# Patient Record
Sex: FEMALE | Race: WHITE | NOT HISPANIC OR LATINO
[De-identification: names, ages, dates, MRNs, and addresses within clinical notes are randomized per-mention and may not be internally consistent; named-entity substitution may affect disease eponyms.]

---

## 2021-06-10 ENCOUNTER — TRANSCRIPTION ENCOUNTER (OUTPATIENT)
Age: 36
End: 2021-06-10

## 2021-06-10 ENCOUNTER — INPATIENT (INPATIENT)
Facility: HOSPITAL | Age: 36
LOS: 2 days | Discharge: HOME | DRG: 416 | End: 2021-06-13
Attending: SURGERY | Admitting: SURGERY
Payer: MEDICAID

## 2021-06-10 VITALS
TEMPERATURE: 99 F | WEIGHT: 216.05 LBS | HEART RATE: 88 BPM | RESPIRATION RATE: 18 BRPM | DIASTOLIC BLOOD PRESSURE: 80 MMHG | SYSTOLIC BLOOD PRESSURE: 129 MMHG | HEIGHT: 64 IN | OXYGEN SATURATION: 96 %

## 2021-06-10 DIAGNOSIS — Z90.3 ACQUIRED ABSENCE OF STOMACH [PART OF]: Chronic | ICD-10-CM

## 2021-06-10 DIAGNOSIS — Z98.84 BARIATRIC SURGERY STATUS: Chronic | ICD-10-CM

## 2021-06-10 LAB
ALBUMIN SERPL ELPH-MCNC: 3.9 G/DL — SIGNIFICANT CHANGE UP (ref 3.3–5)
ALP SERPL-CCNC: 108 U/L — SIGNIFICANT CHANGE UP (ref 40–120)
ALT FLD-CCNC: 330 U/L — HIGH (ref 10–45)
ANION GAP SERPL CALC-SCNC: 12 MMOL/L — SIGNIFICANT CHANGE UP (ref 5–17)
APPEARANCE UR: CLEAR — SIGNIFICANT CHANGE UP
APTT BLD: 29.4 SEC — SIGNIFICANT CHANGE UP (ref 27.5–35.5)
AST SERPL-CCNC: 259 U/L — HIGH (ref 10–40)
BASOPHILS # BLD AUTO: 0.02 K/UL — SIGNIFICANT CHANGE UP (ref 0–0.2)
BASOPHILS NFR BLD AUTO: 0.3 % — SIGNIFICANT CHANGE UP (ref 0–2)
BILIRUB DIRECT SERPL-MCNC: 3.8 MG/DL — HIGH (ref 0–0.2)
BILIRUB INDIRECT FLD-MCNC: 0.4 MG/DL — SIGNIFICANT CHANGE UP (ref 0.2–1)
BILIRUB SERPL-MCNC: 4.2 MG/DL — HIGH (ref 0.2–1.2)
BILIRUB SERPL-MCNC: 4.3 MG/DL — HIGH (ref 0.2–1.2)
BILIRUB UR-MCNC: ABNORMAL
BLD GP AB SCN SERPL QL: NEGATIVE — SIGNIFICANT CHANGE UP
BUN SERPL-MCNC: 5 MG/DL — LOW (ref 7–23)
CALCIUM SERPL-MCNC: 8.7 MG/DL — SIGNIFICANT CHANGE UP (ref 8.4–10.5)
CHLORIDE SERPL-SCNC: 101 MMOL/L — SIGNIFICANT CHANGE UP (ref 96–108)
CO2 SERPL-SCNC: 25 MMOL/L — SIGNIFICANT CHANGE UP (ref 22–31)
COLOR SPEC: YELLOW — SIGNIFICANT CHANGE UP
CREAT SERPL-MCNC: 0.59 MG/DL — SIGNIFICANT CHANGE UP (ref 0.5–1.3)
DIFF PNL FLD: ABNORMAL
EOSINOPHIL # BLD AUTO: 0.21 K/UL — SIGNIFICANT CHANGE UP (ref 0–0.5)
EOSINOPHIL NFR BLD AUTO: 3.1 % — SIGNIFICANT CHANGE UP (ref 0–6)
GLUCOSE SERPL-MCNC: 117 MG/DL — HIGH (ref 70–99)
GLUCOSE UR QL: NEGATIVE — SIGNIFICANT CHANGE UP
HCG UR QL: NEGATIVE — SIGNIFICANT CHANGE UP
HCT VFR BLD CALC: 39.3 % — SIGNIFICANT CHANGE UP (ref 34.5–45)
HGB BLD-MCNC: 12.5 G/DL — SIGNIFICANT CHANGE UP (ref 11.5–15.5)
IMM GRANULOCYTES NFR BLD AUTO: 0.3 % — SIGNIFICANT CHANGE UP (ref 0–1.5)
INR BLD: 1.17 — HIGH (ref 0.88–1.16)
KETONES UR-MCNC: NEGATIVE — SIGNIFICANT CHANGE UP
LEUKOCYTE ESTERASE UR-ACNC: ABNORMAL
LIDOCAIN IGE QN: 21 U/L — SIGNIFICANT CHANGE UP (ref 7–60)
LYMPHOCYTES # BLD AUTO: 1.63 K/UL — SIGNIFICANT CHANGE UP (ref 1–3.3)
LYMPHOCYTES # BLD AUTO: 24.4 % — SIGNIFICANT CHANGE UP (ref 13–44)
MCHC RBC-ENTMCNC: 25.1 PG — LOW (ref 27–34)
MCHC RBC-ENTMCNC: 31.8 GM/DL — LOW (ref 32–36)
MCV RBC AUTO: 78.9 FL — LOW (ref 80–100)
MONOCYTES # BLD AUTO: 0.77 K/UL — SIGNIFICANT CHANGE UP (ref 0–0.9)
MONOCYTES NFR BLD AUTO: 11.5 % — SIGNIFICANT CHANGE UP (ref 2–14)
NEUTROPHILS # BLD AUTO: 4.03 K/UL — SIGNIFICANT CHANGE UP (ref 1.8–7.4)
NEUTROPHILS NFR BLD AUTO: 60.4 % — SIGNIFICANT CHANGE UP (ref 43–77)
NITRITE UR-MCNC: NEGATIVE — SIGNIFICANT CHANGE UP
NRBC # BLD: 0 /100 WBCS — SIGNIFICANT CHANGE UP (ref 0–0)
PH UR: 7.5 — SIGNIFICANT CHANGE UP (ref 5–8)
PLATELET # BLD AUTO: 237 K/UL — SIGNIFICANT CHANGE UP (ref 150–400)
POTASSIUM SERPL-MCNC: 3.8 MMOL/L — SIGNIFICANT CHANGE UP (ref 3.5–5.3)
POTASSIUM SERPL-SCNC: 3.8 MMOL/L — SIGNIFICANT CHANGE UP (ref 3.5–5.3)
PROT SERPL-MCNC: 7.1 G/DL — SIGNIFICANT CHANGE UP (ref 6–8.3)
PROT UR-MCNC: NEGATIVE MG/DL — SIGNIFICANT CHANGE UP
PROTHROM AB SERPL-ACNC: 13.9 SEC — HIGH (ref 10.6–13.6)
RBC # BLD: 4.98 M/UL — SIGNIFICANT CHANGE UP (ref 3.8–5.2)
RBC # FLD: 14.3 % — SIGNIFICANT CHANGE UP (ref 10.3–14.5)
RH IG SCN BLD-IMP: POSITIVE — SIGNIFICANT CHANGE UP
SARS-COV-2 RNA SPEC QL NAA+PROBE: SIGNIFICANT CHANGE UP
SODIUM SERPL-SCNC: 138 MMOL/L — SIGNIFICANT CHANGE UP (ref 135–145)
SP GR SPEC: 1.01 — SIGNIFICANT CHANGE UP (ref 1–1.03)
UROBILINOGEN FLD QL: 2 E.U./DL
WBC # BLD: 6.68 K/UL — SIGNIFICANT CHANGE UP (ref 3.8–10.5)
WBC # FLD AUTO: 6.68 K/UL — SIGNIFICANT CHANGE UP (ref 3.8–10.5)

## 2021-06-10 PROCEDURE — 76705 ECHO EXAM OF ABDOMEN: CPT | Mod: 26

## 2021-06-10 PROCEDURE — 99285 EMERGENCY DEPT VISIT HI MDM: CPT

## 2021-06-10 RX ORDER — CEFTRIAXONE 500 MG/1
1000 INJECTION, POWDER, FOR SOLUTION INTRAMUSCULAR; INTRAVENOUS EVERY 24 HOURS
Refills: 0 | Status: DISCONTINUED | OUTPATIENT
Start: 2021-06-10 | End: 2021-06-13

## 2021-06-10 RX ORDER — METRONIDAZOLE 500 MG
TABLET ORAL
Refills: 0 | Status: DISCONTINUED | OUTPATIENT
Start: 2021-06-10 | End: 2021-06-13

## 2021-06-10 RX ORDER — HEPARIN SODIUM 5000 [USP'U]/ML
5000 INJECTION INTRAVENOUS; SUBCUTANEOUS EVERY 8 HOURS
Refills: 0 | Status: DISCONTINUED | OUTPATIENT
Start: 2021-06-10 | End: 2021-06-13

## 2021-06-10 RX ORDER — KETOROLAC TROMETHAMINE 30 MG/ML
30 SYRINGE (ML) INJECTION ONCE
Refills: 0 | Status: DISCONTINUED | OUTPATIENT
Start: 2021-06-10 | End: 2021-06-10

## 2021-06-10 RX ORDER — ESCITALOPRAM OXALATE 10 MG/1
40 TABLET, FILM COATED ORAL DAILY
Refills: 0 | Status: DISCONTINUED | OUTPATIENT
Start: 2021-06-10 | End: 2021-06-13

## 2021-06-10 RX ORDER — ONDANSETRON 8 MG/1
4 TABLET, FILM COATED ORAL ONCE
Refills: 0 | Status: COMPLETED | OUTPATIENT
Start: 2021-06-10 | End: 2021-06-10

## 2021-06-10 RX ORDER — METRONIDAZOLE 500 MG
500 TABLET ORAL EVERY 8 HOURS
Refills: 0 | Status: DISCONTINUED | OUTPATIENT
Start: 2021-06-11 | End: 2021-06-13

## 2021-06-10 RX ORDER — METRONIDAZOLE 500 MG
500 TABLET ORAL ONCE
Refills: 0 | Status: COMPLETED | OUTPATIENT
Start: 2021-06-10 | End: 2021-06-10

## 2021-06-10 RX ORDER — HYDROMORPHONE HYDROCHLORIDE 2 MG/ML
0.25 INJECTION INTRAMUSCULAR; INTRAVENOUS; SUBCUTANEOUS EVERY 6 HOURS
Refills: 0 | Status: DISCONTINUED | OUTPATIENT
Start: 2021-06-10 | End: 2021-06-13

## 2021-06-10 RX ORDER — ONDANSETRON 8 MG/1
4 TABLET, FILM COATED ORAL EVERY 4 HOURS
Refills: 0 | Status: DISCONTINUED | OUTPATIENT
Start: 2021-06-10 | End: 2021-06-13

## 2021-06-10 RX ORDER — ACETAMINOPHEN 500 MG
1000 TABLET ORAL ONCE
Refills: 0 | Status: DISCONTINUED | OUTPATIENT
Start: 2021-06-10 | End: 2021-06-13

## 2021-06-10 RX ORDER — SODIUM CHLORIDE 9 MG/ML
1000 INJECTION, SOLUTION INTRAVENOUS
Refills: 0 | Status: DISCONTINUED | OUTPATIENT
Start: 2021-06-10 | End: 2021-06-11

## 2021-06-10 RX ORDER — SODIUM CHLORIDE 9 MG/ML
1000 INJECTION INTRAMUSCULAR; INTRAVENOUS; SUBCUTANEOUS ONCE
Refills: 0 | Status: COMPLETED | OUTPATIENT
Start: 2021-06-10 | End: 2021-06-10

## 2021-06-10 RX ADMIN — Medication 30 MILLIGRAM(S): at 17:58

## 2021-06-10 RX ADMIN — Medication 100 MILLIGRAM(S): at 20:32

## 2021-06-10 RX ADMIN — Medication 30 MILLIGRAM(S): at 20:06

## 2021-06-10 RX ADMIN — SODIUM CHLORIDE 1000 MILLILITER(S): 9 INJECTION INTRAMUSCULAR; INTRAVENOUS; SUBCUTANEOUS at 17:55

## 2021-06-10 RX ADMIN — ONDANSETRON 4 MILLIGRAM(S): 8 TABLET, FILM COATED ORAL at 17:56

## 2021-06-10 RX ADMIN — CEFTRIAXONE 100 MILLIGRAM(S): 500 INJECTION, POWDER, FOR SOLUTION INTRAMUSCULAR; INTRAVENOUS at 19:23

## 2021-06-10 NOTE — H&P ADULT - ASSESSMENT
34 yo F no PMH, PSH of lap band 10 years ago and laparoscopic sleeve gastrectomy 5 years ago at [facility] with Dr. Faith who presents with concern for acute cholecystitis w/ or w/o choledocholithiasis.    Plan:  Admit to General Surgery Team 4, Regional Dr. Morales  NPO/IVF  Pain/Nausea control PRN  MRCP  GI consult--Zlatanic for ERCP evaluation  Ceftriaxone/Flagyl  HSQ/SCD  AM labs  Plan discussed with chief resident and attending

## 2021-06-10 NOTE — ED ADULT NURSE NOTE - OBJECTIVE STATEMENT
Patient arrives via EMS for eval of abd pain n/v x a couple of days.  Patient relates "I'm having a gall bladder attack."  Took antispasmodic that GI  prescribed.  EMS placed 20g IV R AC, gave 600ml IV NS, reports feeling better now.

## 2021-06-10 NOTE — ED ADULT NURSE REASSESSMENT NOTE - COMFORT CARE
ambulated to bathroom/wait time explained
plan of care explained/treatment delay explained/wait time explained

## 2021-06-10 NOTE — ED ADULT NURSE REASSESSMENT NOTE - NS ED NURSE REASSESS COMMENT FT1
Patient checking with family member nurse who works here to see if she should accept sub q heparin or not.

## 2021-06-10 NOTE — ED PROVIDER NOTE - OBJECTIVE STATEMENT
history of gallstones, here with abdominal pain for the past 2 days. Right sided, associated with nausea/vomiting, po intolerance. Denies fever, chills, diarrhea. Reports taking bentyl with some relief and having outpatient US that showed gallstones. Pain currently subsided but not resolved. Some radiation to back, lower abdomen.

## 2021-06-10 NOTE — H&P ADULT - HISTORY OF PRESENT ILLNESS
This is a 36 yo F no PMH, PSH of lap band 10 years ago and laparoscopic sleeve gastrectomy 5 years ago at [facility] with Dr. Faith who presents with RUQ abdominal pain since Tuesday. Pt states that she has had "gallbladder attacks" in the past, last one three years ago, but nothing as severe as this. She states the severity of her pain since Tuesday has varied, but it has never subsided completely. She states she has had multiple episodes of emesis, dark colored urine, endorses headaches and dizziness. Denies fevers, chills, chest pain, sob. Pt has never had a colonoscopy, had EGD 2 years ago for evaluation of reflux. She has had ultrasounds in past with evidence of cholelithiasis.     In ED, pt is afebrile, HDS. Labs are significant for bilirubin of 4.3, AST//330.

## 2021-06-10 NOTE — ED ADULT TRIAGE NOTE - CHIEF COMPLAINT QUOTE
BIBA from home c/o abdominal pain with n/v starting 2 days ago. hx gallstones. reports pain is 2/10 at this time. given 600ml NS via 20g in R a/c by EMS. denies fevers.

## 2021-06-10 NOTE — H&P ADULT - NSHPLABSRESULTS_GEN_ALL_CORE
12.5   6.68  )-----------( 237      ( 10 Gee 2021 18:03 )             39.3   06-10    138  |  101  |  5<L>  ----------------------------<  117<H>  3.8   |  25  |  0.59    Ca    8.7      10 Gee 2021 18:03    TPro  7.1  /  Alb  3.9  /  TBili  4.3<H>  /  DBili  x   /  AST  259<H>  /  ALT  330<H>  /  AlkPhos  108  06-10

## 2021-06-10 NOTE — H&P ADULT - NSICDXPASTSURGICALHX_GEN_ALL_CORE_FT
PAST SURGICAL HISTORY:  H/O laparoscopic adjustable gastric banding     History of sleeve gastrectomy

## 2021-06-10 NOTE — H&P ADULT - NSHPPHYSICALEXAM_GEN_ALL_CORE
Vital Signs Last 24 Hrs  T(C): 37.1 (10 Gee 2021 17:23), Max: 37.1 (10 Gee 2021 17:23)  T(F): 98.7 (10 Gee 2021 17:23), Max: 98.7 (10 Gee 2021 17:23)  HR: 88 (10 Gee 2021 17:23) (88 - 88)  BP: 129/80 (10 Gee 2021 17:23) (129/80 - 129/80)  BP(mean): --  RR: 18 (10 Gee 2021 17:23) (18 - 18)  SpO2: 96% (10 Gee 2021 17:23) (96% - 96%)    General: Appears stated age, No acute distress, WD/WN  Head: NC/AT  EENT: PERRLA. EOMI. Conjunctiva and sclera clear. Pharynx clear.  Neck: Supple.  Lungs: CTA B/l. Nonlabored Respirations  CV: +S1S2, RRR  Abdomen: Nondistended, tender in RUQ, soft  Extremities: Warm and well perfused. 2+ peripheral pulses b/l. Calf soft, nontender b/l. No pedal edema.  Neuro: A&Ox3.

## 2021-06-10 NOTE — ED PROVIDER NOTE - CLINICAL SUMMARY MEDICAL DECISION MAKING FREE TEXT BOX
ruq pain x 2 days with n/v, reported gallstones. concern for acute cholecystis vs biliary colic/ pancreatitis. labs / ruq us. known to surgery who came to ED to see her after arrival. requested admission. us pending at time of admit. toradol/ zofran/ ivf for symptoms. wbc normal but lft elevated.

## 2021-06-11 DIAGNOSIS — K80.20 CALCULUS OF GALLBLADDER WITHOUT CHOLECYSTITIS WITHOUT OBSTRUCTION: ICD-10-CM

## 2021-06-11 DIAGNOSIS — K80.50 CALCULUS OF BILE DUCT WITHOUT CHOLANGITIS OR CHOLECYSTITIS WITHOUT OBSTRUCTION: ICD-10-CM

## 2021-06-11 LAB
ALBUMIN SERPL ELPH-MCNC: 3.4 G/DL — SIGNIFICANT CHANGE UP (ref 3.3–5)
ALP SERPL-CCNC: 99 U/L — SIGNIFICANT CHANGE UP (ref 40–120)
ALT FLD-CCNC: 265 U/L — HIGH (ref 10–45)
ANION GAP SERPL CALC-SCNC: 10 MMOL/L — SIGNIFICANT CHANGE UP (ref 5–17)
AST SERPL-CCNC: 178 U/L — HIGH (ref 10–40)
BILIRUB SERPL-MCNC: 4.8 MG/DL — HIGH (ref 0.2–1.2)
BUN SERPL-MCNC: 4 MG/DL — LOW (ref 7–23)
CALCIUM SERPL-MCNC: 8.7 MG/DL — SIGNIFICANT CHANGE UP (ref 8.4–10.5)
CHLORIDE SERPL-SCNC: 99 MMOL/L — SIGNIFICANT CHANGE UP (ref 96–108)
CO2 SERPL-SCNC: 24 MMOL/L — SIGNIFICANT CHANGE UP (ref 22–31)
COVID-19 SPIKE DOMAIN AB INTERP: POSITIVE
COVID-19 SPIKE DOMAIN ANTIBODY RESULT: >250 U/ML — HIGH
CREAT SERPL-MCNC: 0.62 MG/DL — SIGNIFICANT CHANGE UP (ref 0.5–1.3)
GLUCOSE SERPL-MCNC: 95 MG/DL — SIGNIFICANT CHANGE UP (ref 70–99)
HCT VFR BLD CALC: 36.6 % — SIGNIFICANT CHANGE UP (ref 34.5–45)
HGB BLD-MCNC: 11.5 G/DL — SIGNIFICANT CHANGE UP (ref 11.5–15.5)
MAGNESIUM SERPL-MCNC: 1.9 MG/DL — SIGNIFICANT CHANGE UP (ref 1.6–2.6)
MCHC RBC-ENTMCNC: 25.1 PG — LOW (ref 27–34)
MCHC RBC-ENTMCNC: 31.4 GM/DL — LOW (ref 32–36)
MCV RBC AUTO: 79.9 FL — LOW (ref 80–100)
NRBC # BLD: 0 /100 WBCS — SIGNIFICANT CHANGE UP (ref 0–0)
PHOSPHATE SERPL-MCNC: 2.3 MG/DL — LOW (ref 2.5–4.5)
PLATELET # BLD AUTO: 210 K/UL — SIGNIFICANT CHANGE UP (ref 150–400)
POTASSIUM SERPL-MCNC: 3.4 MMOL/L — LOW (ref 3.5–5.3)
POTASSIUM SERPL-SCNC: 3.4 MMOL/L — LOW (ref 3.5–5.3)
PROT SERPL-MCNC: 6.3 G/DL — SIGNIFICANT CHANGE UP (ref 6–8.3)
RBC # BLD: 4.58 M/UL — SIGNIFICANT CHANGE UP (ref 3.8–5.2)
RBC # FLD: 14.4 % — SIGNIFICANT CHANGE UP (ref 10.3–14.5)
SARS-COV-2 IGG+IGM SERPL QL IA: >250 U/ML — HIGH
SARS-COV-2 IGG+IGM SERPL QL IA: POSITIVE
SODIUM SERPL-SCNC: 133 MMOL/L — LOW (ref 135–145)
WBC # BLD: 6.11 K/UL — SIGNIFICANT CHANGE UP (ref 3.8–10.5)
WBC # FLD AUTO: 6.11 K/UL — SIGNIFICANT CHANGE UP (ref 3.8–10.5)

## 2021-06-11 RX ORDER — SODIUM CHLORIDE 9 MG/ML
1000 INJECTION, SOLUTION INTRAVENOUS
Refills: 0 | Status: DISCONTINUED | OUTPATIENT
Start: 2021-06-11 | End: 2021-06-13

## 2021-06-11 RX ORDER — POTASSIUM CHLORIDE 20 MEQ
10 PACKET (EA) ORAL
Refills: 0 | Status: COMPLETED | OUTPATIENT
Start: 2021-06-11 | End: 2021-06-11

## 2021-06-11 RX ORDER — MAGNESIUM SULFATE 500 MG/ML
1 VIAL (ML) INJECTION ONCE
Refills: 0 | Status: COMPLETED | OUTPATIENT
Start: 2021-06-11 | End: 2021-06-11

## 2021-06-11 RX ORDER — POTASSIUM PHOSPHATE, MONOBASIC POTASSIUM PHOSPHATE, DIBASIC 236; 224 MG/ML; MG/ML
15 INJECTION, SOLUTION INTRAVENOUS ONCE
Refills: 0 | Status: COMPLETED | OUTPATIENT
Start: 2021-06-11 | End: 2021-06-11

## 2021-06-11 RX ADMIN — Medication 100 MILLIEQUIVALENT(S): at 11:29

## 2021-06-11 RX ADMIN — CEFTRIAXONE 100 MILLIGRAM(S): 500 INJECTION, POWDER, FOR SOLUTION INTRAMUSCULAR; INTRAVENOUS at 18:28

## 2021-06-11 RX ADMIN — SODIUM CHLORIDE 140 MILLILITER(S): 9 INJECTION, SOLUTION INTRAVENOUS at 02:30

## 2021-06-11 RX ADMIN — Medication 100 MILLIGRAM(S): at 06:19

## 2021-06-11 RX ADMIN — ESCITALOPRAM OXALATE 40 MILLIGRAM(S): 10 TABLET, FILM COATED ORAL at 14:48

## 2021-06-11 RX ADMIN — SODIUM CHLORIDE 100 MILLILITER(S): 9 INJECTION, SOLUTION INTRAVENOUS at 18:28

## 2021-06-11 RX ADMIN — Medication 100 GRAM(S): at 10:35

## 2021-06-11 RX ADMIN — POTASSIUM PHOSPHATE, MONOBASIC POTASSIUM PHOSPHATE, DIBASIC 62.5 MILLIMOLE(S): 236; 224 INJECTION, SOLUTION INTRAVENOUS at 23:51

## 2021-06-11 RX ADMIN — Medication 100 MILLIGRAM(S): at 22:10

## 2021-06-11 RX ADMIN — Medication 100 MILLIEQUIVALENT(S): at 13:55

## 2021-06-11 RX ADMIN — Medication 100 MILLIEQUIVALENT(S): at 12:56

## 2021-06-11 RX ADMIN — Medication 100 MILLIGRAM(S): at 14:49

## 2021-06-11 NOTE — CONSULT NOTE ADULT - PROBLEM SELECTOR RECOMMENDATION 9
1) Continue Ceftriaxone and Metronidazole  2) Pending Blood culture from ED  3) Possible Lap cholecystectomy on Sunday,  4) Management per surgery

## 2021-06-11 NOTE — CONSULT NOTE ADULT - SUBJECTIVE AND OBJECTIVE BOX
GASTROENTEROLOGY CONSULT NOTE  HPI:  This is a 36 yo F no PMH, PSH of lap band 10 years ago and laparoscopic sleeve gastrectomy 5 years ago at [facility] with Dr. Faith who presents with RUQ abdominal pain since Tuesday. Pt states that she has had "gallbladder attacks" in the past, last one three years ago, but nothing as severe as this. She states the severity of her pain since Tuesday has varied, but it has never subsided completely. She states she has had multiple episodes of emesis, dark colored urine, endorses headaches and dizziness. Denies fevers, chills, chest pain, sob. Pt has never had a colonoscopy, had EGD 2 years ago for evaluation of reflux. She has had ultrasounds in past with evidence of cholelithiasis.     In ED, pt is afebrile, HDS. Labs are significant for bilirubin of 4.3, AST//330.   (10 Gee 2021 18:55)    GI consulted for elevated bilirubin; mild biliary ductal dilation. Patient seen and examined at bedside.     Allergies    No Known Allergies    Intolerances      Home Medications:  Lexapro: 40 milligram(s) orally once a day (11 Jun 2021 03:08)  PriLOSEC 40 mg oral delayed release capsule: 1 cap(s) orally once a day (11 Jun 2021 03:08)    MEDICATIONS:  MEDICATIONS  (STANDING):  cefTRIAXone   IVPB 1000 milliGRAM(s) IV Intermittent every 24 hours  escitalopram 40 milliGRAM(s) Oral daily  heparin   Injectable 5000 Unit(s) SubCutaneous every 8 hours  lactated ringers. 1000 milliLiter(s) (140 mL/Hr) IV Continuous <Continuous>  magnesium sulfate  IVPB 1 Gram(s) IV Intermittent once  metroNIDAZOLE  IVPB      metroNIDAZOLE  IVPB 500 milliGRAM(s) IV Intermittent every 8 hours  potassium chloride  10 mEq/100 mL IVPB 10 milliEquivalent(s) IV Intermittent every 1 hour  potassium phosphate IVPB 15 milliMole(s) IV Intermittent once    MEDICATIONS  (PRN):  acetaminophen  IVPB .. 1000 milliGRAM(s) IV Intermittent once PRN Temp greater or equal to 38C (100.4F), Moderate Pain (4 - 6)  HYDROmorphone  Injectable 0.25 milliGRAM(s) IV Push every 6 hours PRN Severe Pain (7 - 10)  ondansetron Injectable 4 milliGRAM(s) IV Push every 4 hours PRN Nausea and/or Vomiting    PAST MEDICAL & SURGICAL HISTORY:  Gallstones    H/O laparoscopic adjustable gastric banding    History of sleeve gastrectomy      FAMILY HISTORY:    SOCIAL HISTORY:  Tobacco: [ ] Current, [ ] Former, [ ] Never; Pack Years:  Alcohol:  Illicit Drugs:    REVIEW OF SYSTEMS:  CONSTITUTIONAL: No weakness, fevers or chills  HEENT: No visual changes; No vertigo or throat pain   NECK: No pain or stiffness  RESPIRATORY: No cough, wheezing, hemoptysis; No shortness of breath  CARDIOVASCULAR: No chest pain or palpitations  GASTROINTESTINAL: As above.  GENITOURINARY: No dysuria, frequency or hematuria  NEUROLOGICAL: No numbness or weakness  SKIN: No itching, burning, rashes, or lesions   All other 10 review of systems is negative unless indicated above.    Vital Signs Last 24 Hrs  T(C): 36.9 (11 Jun 2021 06:25), Max: 37.2 (10 Gee 2021 20:23)  T(F): 98.4 (11 Jun 2021 06:25), Max: 99 (10 Gee 2021 20:23)  HR: 85 (11 Jun 2021 06:25) (74 - 88)  BP: 127/79 (11 Jun 2021 06:25) (118/62 - 133/76)  BP(mean): 95 (11 Jun 2021 06:25) (95 - 95)  RR: 16 (11 Jun 2021 06:25) (16 - 18)  SpO2: 96% (11 Jun 2021 06:25) (96% - 99%)    06-10 @ 07:01  -  06-11 @ 07:00  --------------------------------------------------------  IN: 730 mL / OUT: 300 mL / NET: 430 mL        PHYSICAL EXAM:    General: in no acute distress  Eyes: Anicteric sclerae, moist conjunctivae  HENT: Moist mucous membranes  Neck: Trachea midline, supple  Lungs: Normal respiratory effort, no intercostal retractions  Cardiovascular: RRR  Abdomen: Soft, +RUQ tenderness non-distended; No rebound or guarding  Extremities: Normal range of motion, No clubbing, cyanosis or edema  Neurological: Alert and oriented x3  Skin: Warm and dry. No obvious rash    LABS:                        11.5   6.11  )-----------( 210      ( 11 Jun 2021 05:55 )             36.6     06-11    133<L>  |  99  |  4<L>  ----------------------------<  95  3.4<L>   |  24  |  0.62    Ca    8.7      11 Jun 2021 05:55  Phos  2.3     06-11  Mg     1.9     06-11    TPro  6.3  /  Alb  3.4  /  TBili  4.8<H>  /  DBili  x   /  AST  178<H>  /  ALT  265<H>  /  AlkPhos  99  06-11        PT/INR - ( 10 Gee 2021 18:38 )   PT: 13.9 sec;   INR: 1.17          PTT - ( 10 Gee 2021 18:38 )  PTT:29.4 sec    RADIOLOGY & ADDITIONAL STUDIES:     Reviewed
HPI:  This is a 34 yo F no PMH, PSH of lap band 10 years ago and laparoscopic sleeve gastrectomy 5 years ago at [facility] with Dr. Faith who presents with RUQ abdominal pain since Tuesday. Pt states that she has had "gallbladder attacks" in the past, last one three years ago, but nothing as severe as this. She states the severity of her pain since Tuesday has varied, but it has never subsided completely. She states she has had multiple episodes of emesis, dark colored urine, endorses headaches and dizziness. Denies fevers, chills, chest pain, sob. Pt has never had a colonoscopy, had EGD 2 years ago for evaluation of reflux. She has had ultrasounds in past with evidence of cholelithiasis.     In ED, pt is afebrile, HDS. Labs are significant for bilirubin of 4.3, AST//330.   (10 Gee 2021 18:55)   s/p ERCP w/ sphincterotomy, balloon sweep, sludge removal      PAST MEDICAL & SURGICAL HISTORY:  Gallstones    H/O laparoscopic adjustable gastric banding    History of sleeve gastrectomy      REVIEW OF SYSTEMS:    General:  no weakness; no fevers, no chills  Skin/Breast: no rash  Respiratory and Thorax: no SOB, no cough  Cardiovascular:	No chest pain  Gastrointestinal:	 no nausea, vomiting , (+) frequent stools, RUQ pain  Genitourinary:	no dysuria, no difficulty urinating, no hematuria  Musculoskeletal:	 no weakness, no joint swelling/pain  Neurological:	no focal weakness/numbness  Endocrine: no polyuria, no polydipsia      ANTIBIOTICS:  MEDICATIONS  (STANDING):  cefTRIAXone   IVPB 1000 milliGRAM(s) IV Intermittent every 24 hours  escitalopram 40 milliGRAM(s) Oral daily  heparin   Injectable 5000 Unit(s) SubCutaneous every 8 hours  metroNIDAZOLE  IVPB      metroNIDAZOLE  IVPB 500 milliGRAM(s) IV Intermittent every 8 hours  potassium phosphate IVPB 15 milliMole(s) IV Intermittent once    MEDICATIONS  (PRN):  acetaminophen  IVPB .. 1000 milliGRAM(s) IV Intermittent once PRN Temp greater or equal to 38C (100.4F), Moderate Pain (4 - 6)  HYDROmorphone  Injectable 0.25 milliGRAM(s) IV Push every 6 hours PRN Severe Pain (7 - 10)  ondansetron Injectable 4 milliGRAM(s) IV Push every 4 hours PRN Nausea and/or Vomiting      Allergies: No Known Allergies    SOCIAL HISTORY: no ETOH, no smoking    FAMILY HISTORY: n/c      Vital Signs Last 24 Hrs  T(C): 36.8 (2021 15:29), Max: 37.2 (10 Gee 2021 20:23)  T(F): 98.2 (2021 15:29), Max: 99 (10 Gee 2021 20:23)  HR: 79 (2021 15:) (74 - 85)  BP: 123/76 (2021 15:) (107/70 - 133/76)  BP(mean): 95 (2021 06:25) (95 - 95)  RR: 18 (2021 15:) (16 - 18)  SpO2: 97% (2021 15:) (96% - 99%)    06-10-21 @ 07:01  -  21 @ 07:00  --------------------------------------------------------  IN: 730 mL / OUT: 300 mL / NET: 430 mL    21 @ 07:01  -  21 @ 19:37  --------------------------------------------------------  IN: 0 mL / OUT: 800 mL / NET: -800 mL        PHYSICAL EXAM:  Constitutional: Well-developed, well nourished  Eyes: LOCO, EOMI  Ear/Nose/Throat: no oral lesion, no sinus tenderness on percussion	  Neck: no JVD, no lymphadenopathy, supple  Respiratory: CTA rosenda  Cardiovascular: S1S2 RRR, no murmurs  Gastrointestinal: Soft,(+) BS,  minimally tender to RUQ,  Extremities: no e/e/c  Vascular: DP Pulse: right normal; left normal            LABS:                        11.5   6.11  )-----------( 210      ( 2021 05:55 )             36.6     06-11    133<L>  |  99  |  4<L>  ----------------------------<  95  3.4<L>   |  24  |  0.62    Ca    8.7      2021 05:55  Phos  2.3       Mg     1.9     -    TPro  6.3  /  Alb  3.4  /  TBili  4.8<H>  /  DBili  x   /  AST  178<H>  /  ALT  265<H>  /  AlkPhos  99  06-11    PT/INR - ( 10 Gee 2021 18:38 )   PT: 13.9 sec;   INR: 1.17          PTT - ( 10 Gee 2021 18:38 )  PTT:29.4 sec  Urinalysis Basic - ( 10 Gee 2021 18:04 )    Color: Yellow / Appearance: Clear / S.015 / pH: x  Gluc: x / Ketone: NEGATIVE  / Bili: Moderate / Urobili: 2.0 E.U./dL   Blood: x / Protein: NEGATIVE mg/dL / Nitrite: NEGATIVE   Leuk Esterase: Small / RBC: < 5 /HPF / WBC < 5 /HPF   Sq Epi: x / Non Sq Epi: Moderate /HPF / Bacteria: Present /HPF      MICROBIOLOGY:    RADIOLOGY & ADDITIONAL STUDIES:  EXAM:  US ABDOMEN LIMITED                          PROCEDURE DATE:  06/10/2021          INTERPRETATION:  RIGHT UPPER QUADRANT ULTRASOUND dated 6/10/2021 9:00 PM    INDICATION: Right upper quadrant pain/gallstones.    PRIOR STUDIES: None.    FINDINGS:    Liver: Borderline increased echogenicity with no visible focal abnormality. Liver measures 16.7 cm in craniocaudal dimensions.    Main portal vein: Hepatopetal flow.    Intrahepatic ducts: Not dilated.    Common bile duct: Measures 0.7 cm. Borderline dilated.    Gallbladder: Multiple layering tiny gallstones.  No GB wall thickening or pericholecystic fluid. Mild gallbladder distention. Limited assessment for sonographic Taylor sign given the patient was on pain medication.    Pancreas: Visualized pancreas is unremarkable.    Abdominal aorta: No abdominal aortic aneurysm is seen.    Inferior vena cava: The visualized portions were normal in appearance.    Right kidney: Length of 11.1 cm. Normal echogenicity. No focal lesions.    IMPRESSION:  1.  Cholelithiasis without definitive evidence of acute cholecystitis.

## 2021-06-11 NOTE — CONSULT NOTE ADULT - ASSESSMENT
34 yo F no PMH, PSH of lap band 10 years ago and laparoscopic sleeve gastrectomy 5 years ago at [facility] with Dr. Faith who presents with RUQ abdominal pain since Tuesday. GI consulted for elevated bilirubin; mild biliary ductal dilation.     #Biliary dilation  - U/S images personally reviewed; mild biliary ductal dilation, no obvious choledocholithiasis  - s/p ERCP w/ sphincterotomy, balloon sweep, sludge removal  - remainder of care per Surgery  - monitor liver tests    Alondra Wheeler MD  PGY-4, Gastroenterology Fellow  pager: [phone]
34 yo F no PMH, PSH of lap band 10 years ago and laparoscopic sleeve gastrectomy 5 years ago at [facility] with Dr. Faith who presents with RUQ abdominal pain x 3 days, on US Cholelithiasis, s/p ERCP with sludge removal, bilirubin trending up 4.8

## 2021-06-12 ENCOUNTER — TRANSCRIPTION ENCOUNTER (OUTPATIENT)
Age: 36
End: 2021-06-12

## 2021-06-12 LAB
ALBUMIN SERPL ELPH-MCNC: 3.7 G/DL — SIGNIFICANT CHANGE UP (ref 3.3–5)
ALP SERPL-CCNC: 100 U/L — SIGNIFICANT CHANGE UP (ref 40–120)
ALT FLD-CCNC: 197 U/L — HIGH (ref 10–45)
ANION GAP SERPL CALC-SCNC: 10 MMOL/L — SIGNIFICANT CHANGE UP (ref 5–17)
AST SERPL-CCNC: 65 U/L — HIGH (ref 10–40)
BILIRUB SERPL-MCNC: 1 MG/DL — SIGNIFICANT CHANGE UP (ref 0.2–1.2)
BUN SERPL-MCNC: 4 MG/DL — LOW (ref 7–23)
CALCIUM SERPL-MCNC: 8.8 MG/DL — SIGNIFICANT CHANGE UP (ref 8.4–10.5)
CHLORIDE SERPL-SCNC: 106 MMOL/L — SIGNIFICANT CHANGE UP (ref 96–108)
CO2 SERPL-SCNC: 24 MMOL/L — SIGNIFICANT CHANGE UP (ref 22–31)
CREAT SERPL-MCNC: 0.58 MG/DL — SIGNIFICANT CHANGE UP (ref 0.5–1.3)
GLUCOSE SERPL-MCNC: 119 MG/DL — HIGH (ref 70–99)
HCT VFR BLD CALC: 37.7 % — SIGNIFICANT CHANGE UP (ref 34.5–45)
HGB BLD-MCNC: 11.8 G/DL — SIGNIFICANT CHANGE UP (ref 11.5–15.5)
LIDOCAIN IGE QN: 83 U/L — HIGH (ref 7–60)
MAGNESIUM SERPL-MCNC: 2 MG/DL — SIGNIFICANT CHANGE UP (ref 1.6–2.6)
MCHC RBC-ENTMCNC: 25.4 PG — LOW (ref 27–34)
MCHC RBC-ENTMCNC: 31.3 GM/DL — LOW (ref 32–36)
MCV RBC AUTO: 81.3 FL — SIGNIFICANT CHANGE UP (ref 80–100)
NRBC # BLD: 0 /100 WBCS — SIGNIFICANT CHANGE UP (ref 0–0)
PHOSPHATE SERPL-MCNC: 3 MG/DL — SIGNIFICANT CHANGE UP (ref 2.5–4.5)
PLATELET # BLD AUTO: 237 K/UL — SIGNIFICANT CHANGE UP (ref 150–400)
POTASSIUM SERPL-MCNC: 4.1 MMOL/L — SIGNIFICANT CHANGE UP (ref 3.5–5.3)
POTASSIUM SERPL-SCNC: 4.1 MMOL/L — SIGNIFICANT CHANGE UP (ref 3.5–5.3)
PROT SERPL-MCNC: 6.5 G/DL — SIGNIFICANT CHANGE UP (ref 6–8.3)
RBC # BLD: 4.64 M/UL — SIGNIFICANT CHANGE UP (ref 3.8–5.2)
RBC # FLD: 14.5 % — SIGNIFICANT CHANGE UP (ref 10.3–14.5)
SODIUM SERPL-SCNC: 140 MMOL/L — SIGNIFICANT CHANGE UP (ref 135–145)
WBC # BLD: 7.26 K/UL — SIGNIFICANT CHANGE UP (ref 3.8–10.5)
WBC # FLD AUTO: 7.26 K/UL — SIGNIFICANT CHANGE UP (ref 3.8–10.5)

## 2021-06-12 PROCEDURE — 99232 SBSQ HOSP IP/OBS MODERATE 35: CPT | Mod: GC

## 2021-06-12 RX ORDER — FAMOTIDINE 10 MG/ML
20 INJECTION INTRAVENOUS DAILY
Refills: 0 | Status: DISCONTINUED | OUTPATIENT
Start: 2021-06-12 | End: 2021-06-13

## 2021-06-12 RX ADMIN — FAMOTIDINE 20 MILLIGRAM(S): 10 INJECTION INTRAVENOUS at 19:33

## 2021-06-12 RX ADMIN — Medication 100 MILLIGRAM(S): at 05:39

## 2021-06-12 RX ADMIN — Medication 100 MILLIGRAM(S): at 13:42

## 2021-06-12 RX ADMIN — Medication 100 MILLIGRAM(S): at 21:44

## 2021-06-12 RX ADMIN — ESCITALOPRAM OXALATE 40 MILLIGRAM(S): 10 TABLET, FILM COATED ORAL at 11:16

## 2021-06-12 RX ADMIN — CEFTRIAXONE 100 MILLIGRAM(S): 500 INJECTION, POWDER, FOR SOLUTION INTRAMUSCULAR; INTRAVENOUS at 19:01

## 2021-06-12 NOTE — PROGRESS NOTE ADULT - ATTENDING COMMENTS
ERCP was done today, CBD stones and sludge removed, sphincterotomy done, NPO, IVF, IV ABX, F/U LFT'S, cholecystectomy when T.bili is less than 1.5
No complaints, no pain.  LFTs are improving.  For cholecystectomy.
S/P ERCP with papillotomy and removal of CBD stone and sludge,  T.bili 1 from 4.8, PREOP for cholecystectomy.

## 2021-06-13 ENCOUNTER — TRANSCRIPTION ENCOUNTER (OUTPATIENT)
Age: 36
End: 2021-06-13

## 2021-06-13 VITALS
OXYGEN SATURATION: 95 % | DIASTOLIC BLOOD PRESSURE: 87 MMHG | TEMPERATURE: 99 F | SYSTOLIC BLOOD PRESSURE: 123 MMHG | RESPIRATION RATE: 16 BRPM | HEART RATE: 72 BPM

## 2021-06-13 LAB
ALBUMIN SERPL ELPH-MCNC: 3.7 G/DL — SIGNIFICANT CHANGE UP (ref 3.3–5)
ALP SERPL-CCNC: 89 U/L — SIGNIFICANT CHANGE UP (ref 40–120)
ALT FLD-CCNC: 154 U/L — HIGH (ref 10–45)
ANION GAP SERPL CALC-SCNC: 13 MMOL/L — SIGNIFICANT CHANGE UP (ref 5–17)
APTT BLD: 30.3 SEC — SIGNIFICANT CHANGE UP (ref 27.5–35.5)
AST SERPL-CCNC: 54 U/L — HIGH (ref 10–40)
BILIRUB SERPL-MCNC: 0.7 MG/DL — SIGNIFICANT CHANGE UP (ref 0.2–1.2)
BUN SERPL-MCNC: 8 MG/DL — SIGNIFICANT CHANGE UP (ref 7–23)
CALCIUM SERPL-MCNC: 8.9 MG/DL — SIGNIFICANT CHANGE UP (ref 8.4–10.5)
CHLORIDE SERPL-SCNC: 102 MMOL/L — SIGNIFICANT CHANGE UP (ref 96–108)
CO2 SERPL-SCNC: 26 MMOL/L — SIGNIFICANT CHANGE UP (ref 22–31)
CREAT SERPL-MCNC: 0.67 MG/DL — SIGNIFICANT CHANGE UP (ref 0.5–1.3)
GLUCOSE SERPL-MCNC: 56 MG/DL — LOW (ref 70–99)
HCT VFR BLD CALC: 39.8 % — SIGNIFICANT CHANGE UP (ref 34.5–45)
HGB BLD-MCNC: 12.3 G/DL — SIGNIFICANT CHANGE UP (ref 11.5–15.5)
INR BLD: 1.11 — SIGNIFICANT CHANGE UP (ref 0.88–1.16)
MAGNESIUM SERPL-MCNC: 2.1 MG/DL — SIGNIFICANT CHANGE UP (ref 1.6–2.6)
MCHC RBC-ENTMCNC: 25.4 PG — LOW (ref 27–34)
MCHC RBC-ENTMCNC: 30.9 GM/DL — LOW (ref 32–36)
MCV RBC AUTO: 82.1 FL — SIGNIFICANT CHANGE UP (ref 80–100)
NRBC # BLD: 0 /100 WBCS — SIGNIFICANT CHANGE UP (ref 0–0)
PHOSPHATE SERPL-MCNC: 3.6 MG/DL — SIGNIFICANT CHANGE UP (ref 2.5–4.5)
PLATELET # BLD AUTO: 253 K/UL — SIGNIFICANT CHANGE UP (ref 150–400)
POTASSIUM SERPL-MCNC: 3.9 MMOL/L — SIGNIFICANT CHANGE UP (ref 3.5–5.3)
POTASSIUM SERPL-SCNC: 3.9 MMOL/L — SIGNIFICANT CHANGE UP (ref 3.5–5.3)
PROT SERPL-MCNC: 6.8 G/DL — SIGNIFICANT CHANGE UP (ref 6–8.3)
PROTHROM AB SERPL-ACNC: 13.2 SEC — SIGNIFICANT CHANGE UP (ref 10.6–13.6)
RBC # BLD: 4.85 M/UL — SIGNIFICANT CHANGE UP (ref 3.8–5.2)
RBC # FLD: 15 % — HIGH (ref 10.3–14.5)
SARS-COV-2 RNA SPEC QL NAA+PROBE: SIGNIFICANT CHANGE UP
SODIUM SERPL-SCNC: 141 MMOL/L — SIGNIFICANT CHANGE UP (ref 135–145)
WBC # BLD: 8.58 K/UL — SIGNIFICANT CHANGE UP (ref 3.8–10.5)
WBC # FLD AUTO: 8.58 K/UL — SIGNIFICANT CHANGE UP (ref 3.8–10.5)

## 2021-06-13 RX ORDER — SODIUM CHLORIDE 9 MG/ML
1000 INJECTION, SOLUTION INTRAVENOUS
Refills: 0 | Status: DISCONTINUED | OUTPATIENT
Start: 2021-06-13 | End: 2021-06-13

## 2021-06-13 RX ORDER — ONDANSETRON 8 MG/1
1 TABLET, FILM COATED ORAL
Qty: 10 | Refills: 0
Start: 2021-06-13 | End: 2021-06-22

## 2021-06-13 RX ORDER — HYDROMORPHONE HYDROCHLORIDE 2 MG/ML
0.5 INJECTION INTRAMUSCULAR; INTRAVENOUS; SUBCUTANEOUS
Refills: 0 | Status: DISCONTINUED | OUTPATIENT
Start: 2021-06-13 | End: 2021-06-13

## 2021-06-13 RX ORDER — OXYCODONE AND ACETAMINOPHEN 5; 325 MG/1; MG/1
1 TABLET ORAL
Qty: 12 | Refills: 0
Start: 2021-06-13 | End: 2021-06-15

## 2021-06-13 RX ORDER — ONDANSETRON 8 MG/1
1 TABLET, FILM COATED ORAL
Qty: 12 | Refills: 0
Start: 2021-06-13 | End: 2021-06-16

## 2021-06-13 RX ORDER — BUPIVACAINE 13.3 MG/ML
20 INJECTION, SUSPENSION, LIPOSOMAL INFILTRATION ONCE
Refills: 0 | Status: DISCONTINUED | OUTPATIENT
Start: 2021-06-13 | End: 2021-06-13

## 2021-06-13 RX ADMIN — Medication 100 MILLIGRAM(S): at 16:13

## 2021-06-13 RX ADMIN — Medication 100 MILLIGRAM(S): at 05:09

## 2021-06-13 NOTE — DISCHARGE NOTE PROVIDER - CARE PROVIDERS DIRECT ADDRESSES
,DirectAddress_Unknown,[email or web address]

## 2021-06-13 NOTE — PROGRESS NOTE ADULT - SUBJECTIVE AND OBJECTIVE BOX
DAILY PROGRESS NOTE:    INTERVAL HPI/OVERNIGHT EVENTS:    Admitted overnight with concern for choledocholithasis. No emesis overnight. This morning, brought to endoscopy suite, where she was seen preparing for ERCP. She has some itching today. Abdominal pain improving.     MEDICATIONS  (STANDING):  cefTRIAXone   IVPB 1000 milliGRAM(s) IV Intermittent every 24 hours  escitalopram 40 milliGRAM(s) Oral daily  heparin   Injectable 5000 Unit(s) SubCutaneous every 8 hours  lactated ringers. 1000 milliLiter(s) (140 mL/Hr) IV Continuous <Continuous>  magnesium sulfate  IVPB 1 Gram(s) IV Intermittent once  metroNIDAZOLE  IVPB      metroNIDAZOLE  IVPB 500 milliGRAM(s) IV Intermittent every 8 hours  potassium chloride  10 mEq/100 mL IVPB 10 milliEquivalent(s) IV Intermittent every 1 hour  potassium phosphate IVPB 15 milliMole(s) IV Intermittent once    MEDICATIONS  (PRN):  acetaminophen  IVPB .. 1000 milliGRAM(s) IV Intermittent once PRN Temp greater or equal to 38C (100.4F), Moderate Pain (4 - 6)  HYDROmorphone  Injectable 0.25 milliGRAM(s) IV Push every 6 hours PRN Severe Pain (7 - 10)  ondansetron Injectable 4 milliGRAM(s) IV Push every 4 hours PRN Nausea and/or Vomiting      Vital Signs Last 24 Hrs  T(C): 36.9 (2021 06:25), Max: 37.2 (10 Gee 2021 20:23)  T(F): 98.4 (2021 06:25), Max: 99 (10 Gee 2021 20:23)  HR: 85 (2021 06:25) (74 - 88)  BP: 127/79 (2021 06:25) (118/62 - 133/76)  BP(mean): 95 (2021 06:25) (95 - 95)  RR: 16 (2021 06:25) (16 - 18)  SpO2: 96% (2021 06:25) (96% - 99%)    I&O's Detail    10 Gee 2021 07:01  -  2021 07:00  --------------------------------------------------------  IN:    IV PiggyBack: 100 mL    Lactated Ringers: 630 mL  Total IN: 730 mL    OUT:    Voided (mL): 300 mL  Total OUT: 300 mL    Total NET: 430 mL          Physical Exam:      Neuro: NAD, A&Ox3  Resp: No incr WOB  Abd: Soft, minimally tender to RUQ, no rash over abdomen (area of itching)  Extr: WWP, no LE edema      LABS:                        11.5   6.11  )-----------( 210      ( 2021 05:55 )             36.6     06-11    133<L>  |  99  |  4<L>  ----------------------------<  95  3.4<L>   |  24  |  0.62    Ca    8.7      2021 05:55  Phos  2.3     06-11  Mg     1.9     -11    TPro  6.3  /  Alb  3.4  /  TBili  4.8<H>  /  DBili  x   /  AST  178<H>  /  ALT  265<H>  /  AlkPhos  99  06-11    PT/INR - ( 10 Gee 2021 18:38 )   PT: 13.9 sec;   INR: 1.17          PTT - ( 10 Gee 2021 18:38 )  PTT:29.4 sec  Urinalysis Basic - ( 10 Gee 2021 18:04 )    Color: Yellow / Appearance: Clear / S.015 / pH: x  Gluc: x / Ketone: NEGATIVE  / Bili: Moderate / Urobili: 2.0 E.U./dL   Blood: x / Protein: NEGATIVE mg/dL / Nitrite: NEGATIVE   Leuk Esterase: Small / RBC: < 5 /HPF / WBC < 5 /HPF   Sq Epi: x / Non Sq Epi: Moderate /HPF / Bacteria: Present /HPF        RADIOLOGY & ADDITIONAL STUDIES:
SUBJECTIVE:   Patient seen and examined bedside by chief resident.  "no pain whatsoever"  Denies n/v/cp/sob  Voiding     cefTRIAXone   IVPB 1000 milliGRAM(s) IV Intermittent every 24 hours  heparin   Injectable 5000 Unit(s) SubCutaneous every 8 hours  metroNIDAZOLE  IVPB      metroNIDAZOLE  IVPB 500 milliGRAM(s) IV Intermittent every 8 hours    MEDICATIONS  (PRN):  acetaminophen  IVPB .. 1000 milliGRAM(s) IV Intermittent once PRN Temp greater or equal to 38C (100.4F), Moderate Pain (4 - 6)  HYDROmorphone  Injectable 0.25 milliGRAM(s) IV Push every 6 hours PRN Severe Pain (7 - 10)  ondansetron Injectable 4 milliGRAM(s) IV Push every 4 hours PRN Nausea and/or Vomiting      I&O's Detail    2021 07:01  -  2021 07:00  --------------------------------------------------------  IN:  Total IN: 0 mL    OUT:    Voided (mL): 1900 mL  Total OUT: 1900 mL    Total NET: -1900 mL          Vital Signs Last 24 Hrs  T(C): 36.9 (2021 08:32), Max: 36.9 (2021 00:18)  T(F): 98.4 (2021 08:32), Max: 98.5 (2021 00:18)  HR: 90 (2021 08:32) (77 - 97)  BP: 104/65 (2021 08:32) (104/65 - 123/76)  BP(mean): --  RR: 16 (2021 08:32) (16 - 18)  SpO2: 95% (2021 08:32) (95% - 97%)    General: NAD, resting comfortably in bed  C/V: NSR  Pulm: Nonlabored breathing, no respiratory distress  Abd: soft, ND, minimal TTP in epigastric region w/o R/G.  Extrem: WWP, no edema, SCDs in place    LABS:                        11.8   7.26  )-----------( 237      ( 2021 07:53 )             37.7     06-12    140  |  106  |  4<L>  ----------------------------<  119<H>  4.1   |  24  |  0.58    Ca    8.8      2021 07:53  Phos  3.0     06-12  Mg     2.0     06-12    TPro  6.5  /  Alb  3.7  /  TBili  1.0  /  DBili  x   /  AST  65<H>  /  ALT  197<H>  /  AlkPhos  100  06-12    PT/INR - ( 10 Gee 2021 18:38 )   PT: 13.9 sec;   INR: 1.17          PTT - ( 10 Gee 2021 18:38 )  PTT:29.4 sec  Urinalysis Basic - ( 10 Gee 2021 18:04 )    Color: Yellow / Appearance: Clear / S.015 / pH: x  Gluc: x / Ketone: NEGATIVE  / Bili: Moderate / Urobili: 2.0 E.U./dL   Blood: x / Protein: NEGATIVE mg/dL / Nitrite: NEGATIVE   Leuk Esterase: Small / RBC: < 5 /HPF / WBC < 5 /HPF   Sq Epi: x / Non Sq Epi: Moderate /HPF / Bacteria: Present /HPF        RADIOLOGY & ADDITIONAL STUDIES:    
SUBJECTIVE: Patient seen and examined bedside by chief resident, feels well, pain under control, no nausea or vomiting, tolerated clears.     cefTRIAXone   IVPB 1000 milliGRAM(s) IV Intermittent every 24 hours  heparin   Injectable 5000 Unit(s) SubCutaneous every 8 hours  metroNIDAZOLE  IVPB      metroNIDAZOLE  IVPB 500 milliGRAM(s) IV Intermittent every 8 hours    MEDICATIONS  (PRN):  acetaminophen  IVPB .. 1000 milliGRAM(s) IV Intermittent once PRN Temp greater or equal to 38C (100.4F), Moderate Pain (4 - 6)  HYDROmorphone  Injectable 0.25 milliGRAM(s) IV Push every 6 hours PRN Severe Pain (7 - 10)  ondansetron Injectable 4 milliGRAM(s) IV Push every 4 hours PRN Nausea and/or Vomiting      I&O's Detail    12 Jun 2021 07:01  -  13 Jun 2021 07:00  --------------------------------------------------------  IN:  Total IN: 0 mL    OUT:    Voided (mL): 400 mL  Total OUT: 400 mL    Total NET: -400 mL      13 Jun 2021 07:01  -  13 Jun 2021 17:55  --------------------------------------------------------  IN:    Lactated Ringers: 140 mL  Total IN: 140 mL    OUT:    Voided (mL): 300 mL  Total OUT: 300 mL    Total NET: -160 mL          Vital Signs Last 24 Hrs  T(C): 36.7 (13 Jun 2021 08:54), Max: 37.1 (12 Jun 2021 20:15)  T(F): 98.1 (13 Jun 2021 08:54), Max: 98.7 (12 Jun 2021 20:15)  HR: 71 (13 Jun 2021 16:15) (64 - 72)  BP: 119/71 (13 Jun 2021 16:15) (112/58 - 124/68)  BP(mean): 89 (13 Jun 2021 16:15) (79 - 91)  RR: 14 (13 Jun 2021 16:15) (14 - 17)  SpO2: 94% (13 Jun 2021 16:15) (93% - 99%)    General: NAD, resting comfortably in bed  C/V: Regular rate  Pulm: Nonlabored breathing, no respiratory distress  Abd: soft, Non distended, non tender, incisions c/d/i  Extrem: WWP, no edema, SCDs in place    LABS:                        12.3   8.58  )-----------( 253      ( 13 Jun 2021 12:49 )             39.8     06-13    141  |  102  |  8   ----------------------------<  56<L>  3.9   |  26  |  0.67    Ca    8.9      13 Jun 2021 12:49  Phos  3.6     06-13  Mg     2.1     06-13    TPro  6.8  /  Alb  3.7  /  TBili  0.7  /  DBili  x   /  AST  54<H>  /  ALT  154<H>  /  AlkPhos  89  06-13    PT/INR - ( 13 Jun 2021 12:49 )   PT: 13.2 sec;   INR: 1.11          PTT - ( 13 Jun 2021 12:49 )  PTT:30.3 sec      RADIOLOGY & ADDITIONAL STUDIES:    
SUBJECTIVE: Patient seen and examined bedside by chief resident, feels well, pain and nausea under control, is aware of plan for surgery this AM.     cefTRIAXone   IVPB 1000 milliGRAM(s) IV Intermittent every 24 hours  heparin   Injectable 5000 Unit(s) SubCutaneous every 8 hours  metroNIDAZOLE  IVPB      metroNIDAZOLE  IVPB 500 milliGRAM(s) IV Intermittent every 8 hours    MEDICATIONS  (PRN):  acetaminophen  IVPB .. 1000 milliGRAM(s) IV Intermittent once PRN Temp greater or equal to 38C (100.4F), Moderate Pain (4 - 6)  HYDROmorphone  Injectable 0.25 milliGRAM(s) IV Push every 6 hours PRN Severe Pain (7 - 10)  ondansetron Injectable 4 milliGRAM(s) IV Push every 4 hours PRN Nausea and/or Vomiting      I&O's Detail    12 Jun 2021 07:01  -  13 Jun 2021 07:00  --------------------------------------------------------  IN:  Total IN: 0 mL    OUT:    Voided (mL): 400 mL  Total OUT: 400 mL    Total NET: -400 mL    Vital Signs Last 24 Hrs  T(C): 36.7 (13 Jun 2021 08:54), Max: 37.1 (12 Jun 2021 20:15)  T(F): 98.1 (13 Jun 2021 08:54), Max: 98.7 (12 Jun 2021 20:15)  HR: 72 (13 Jun 2021 08:54) (64 - 72)  BP: 115/70 (13 Jun 2021 08:54) (112/75 - 121/72)  BP(mean): --  RR: 16 (13 Jun 2021 08:54) (16 - 17)  SpO2: 96% (13 Jun 2021 08:54) (96% - 99%)    General: NAD, resting comfortably in bed  C/V: Regular rate  Pulm: Nonlabored breathing, no respiratory distress  Abd: soft, non distended, mildly tender to palpation in the RUQ  Extrem: WWP, no edema, SCDs in place    LABS:                        11.8   7.26  )-----------( 237      ( 12 Jun 2021 07:53 )             37.7     06-12    140  |  106  |  4<L>  ----------------------------<  119<H>  4.1   |  24  |  0.58    Ca    8.8      12 Jun 2021 07:53  Phos  3.0     06-12  Mg     2.0     06-12    TPro  6.5  /  Alb  3.7  /  TBili  1.0  /  DBili  x   /  AST  65<H>  /  ALT  197<H>  /  AlkPhos  100  06-12    
GASTROENTEROLOGY PROGRESS NOTE  Patient seen and examined at bedside.    PERTINENT REVIEW OF SYSTEMS:  As noted above    Allergies    No Known Allergies    Intolerances      MEDICATIONS:  MEDICATIONS  (STANDING):  cefTRIAXone   IVPB 1000 milliGRAM(s) IV Intermittent every 24 hours  dextrose 5% + sodium chloride 0.45%. 1000 milliLiter(s) (100 mL/Hr) IV Continuous <Continuous>  escitalopram 40 milliGRAM(s) Oral daily  heparin   Injectable 5000 Unit(s) SubCutaneous every 8 hours  metroNIDAZOLE  IVPB      metroNIDAZOLE  IVPB 500 milliGRAM(s) IV Intermittent every 8 hours    MEDICATIONS  (PRN):  acetaminophen  IVPB .. 1000 milliGRAM(s) IV Intermittent once PRN Temp greater or equal to 38C (100.4F), Moderate Pain (4 - 6)  HYDROmorphone  Injectable 0.25 milliGRAM(s) IV Push every 6 hours PRN Severe Pain (7 - 10)  ondansetron Injectable 4 milliGRAM(s) IV Push every 4 hours PRN Nausea and/or Vomiting    Vital Signs Last 24 Hrs  T(C): 37 (12 Jun 2021 16:16), Max: 37 (12 Jun 2021 16:16)  T(F): 98.6 (12 Jun 2021 16:16), Max: 98.6 (12 Jun 2021 16:16)  HR: 65 (12 Jun 2021 16:16) (65 - 97)  BP: 114/71 (12 Jun 2021 16:16) (104/65 - 115/69)  BP(mean): --  RR: 16 (12 Jun 2021 16:16) (16 - 16)  SpO2: 98% (12 Jun 2021 16:16) (95% - 98%)    06-11 @ 07:01  -  06-12 @ 07:00  --------------------------------------------------------  IN: 0 mL / OUT: 1900 mL / NET: -1900 mL      PHYSICAL EXAM:    General: Well developed; in no acute distress  HEENT: MMM, conjunctiva and sclera clear  Gastrointestinal: Soft non-tender non-distended; No rebound or guarding  Skin: Warm and dry. No obvious rash    LABS:                        11.8   7.26  )-----------( 237      ( 12 Jun 2021 07:53 )             37.7     06-12    140  |  106  |  4<L>  ----------------------------<  119<H>  4.1   |  24  |  0.58    Ca    8.8      12 Jun 2021 07:53  Phos  3.0     06-12  Mg     2.0     06-12    TPro  6.5  /  Alb  3.7  /  TBili  1.0  /  DBili  x   /  AST  65<H>  /  ALT  197<H>  /  AlkPhos  100  06-12                      RADIOLOGY & ADDITIONAL STUDIES:  Reviewed

## 2021-06-13 NOTE — DISCHARGE NOTE PROVIDER - HOSPITAL COURSE
34 yo F no PMH, PSH of lap band 10 years ago and laparoscopic sleeve gastrectomy 5 years ago at [facility] with Dr. Faith who presents with concern for acute cholecystitis w/ or w/o choledocholithiasis. On MRCP we visualized stoned in the CDB, so she underwent an ERCP with sphincterotomy and balloon sweep. Once she was stale from the ERCP, decision was made perform Robotic assisted Cholecystectomy which was non perforate and non gangrenous. She had an uncomplicated procedure, and    34 yo F no PMH, PSH of lap band 10 years ago and laparoscopic sleeve gastrectomy 5 years ago at [facility] with Dr. Faith who presents with concern for acute cholecystitis w/ or w/o choledocholithiasis. On MRCP we visualized stoned in the CDB, so she underwent an ERCP with sphincterotomy and balloon sweep. Once she was stale from the ERCP, decision was made perform Robotic assisted Cholecystectomy which was non perforate and non gangrenous. The procedure was uncomplicated and well tolerated by the patient. The post-operative course was uncomplicated. Diet was advanced as tolerated, and pain was well controlled on medication. On day of discharge, patient had stable vital signs, was tolerating diet, voiding without assistance, and pain was controlled. The patient is to follow up in 2 weeks.

## 2021-06-13 NOTE — BRIEF OPERATIVE NOTE - OPERATION/FINDINGS
Pt placed in reverse Trendelenburg w/ right side up. Robot docked. GB fundus retracted superiorly over dome of liver. GB infundibulum retracted laterally towards RLQ exposing Calot’s triangle. Critical view of safety obtained. Cystic duct and artery clipped and divided. Peritoneal attachments b/w GB & liver bed  w/ electrocautery. Leakage of bile followed by suctioning and washout. Hemostasis achieved. No additional leakage of bile from cystic duct stump.

## 2021-06-13 NOTE — PROGRESS NOTE ADULT - REASON FOR ADMISSION
concern for acute cholecystitis

## 2021-06-13 NOTE — DISCHARGE NOTE NURSING/CASE MANAGEMENT/SOCIAL WORK - PATIENT PORTAL LINK FT
You can access the FollowMyHealth Patient Portal offered by [facility] by registering at the following website: [email or web address]. By joining [employer]’s FollowMyHealth portal, you will also be able to view your health information using other applications (apps) compatible with our system.

## 2021-06-13 NOTE — PROGRESS NOTE ADULT - ASSESSMENT
34 yo F no PMH, PSH of lap band 10 years ago and laparoscopic sleeve gastrectomy 5 years ago at [facility] with Dr. Faith who presents with RUQ abdominal pain since Tuesday. GI consulted for elevated bilirubin; mild biliary ductal dilation.     #Biliary dilation  - U/S images personally reviewed; mild biliary ductal dilation, no obvious choledocholithiasis  - s/p ERCP w/ sphincterotomy, balloon sweep, sludge removal  - Tbili normalized post procedure  - remainder of care per Surgery  - will sign off    Recommendations discussed with primary team  Plan discussed with GI service attending    Isaias Jones MD  PGY-4 GI fellow  Pager: [phone]  
34 yo F no PMH, PSH of lap band 10 years ago and laparoscopic sleeve gastrectomy 5 years ago at [facility] with Dr. Faith who presents with concern for acute cholecystitis w/ or w/o choledocholithiasis now s/p ERCP sphincterotomy and balloon sweep    Plan:  CLD/IVF  Pain/Nausea control PRN  Ceftriaxone/Flagyl  HSQ/SCD  AM labs  
36 yo F no PMH, PSH of lap band 10 years ago and laparoscopic sleeve gastrectomy 5 years ago at [facility] with Dr. Faith who presents with concern for acute cholecystitis w/ or w/o choledocholithiasis now s/p ERCP sphincterotomy and balloon sweep    - OR for Robotic Cholecystectomy this AM, plans pending OR   
36 yo F no PMH, PSH of lap band 10 years ago and laparoscopic sleeve gastrectomy 5 years ago at [facility] (Dr. Faith) who presents with RUQ abdominal pain, with RUQ revealing dilated CBD to 0.7 cm with layering gallstones and sludge, and elevated t bili of 4.3 with AST and ALT elevation concerning for choledocholithiasis.     Plan for ERCP with Dr. Burnett (happening right now)  Post op check after ERCP  Continue IV fluid resuscitation  If appropriate, will start clear liquid diet when bilirubin downtrending  Trend bilirubin  DVT ppx- SCD, DVT chemoprophylaxis to be restarted after ERCP  Encourage OOB/Ambulation  Will discuss timing of cholecystectomy after ERCP completed on this admission
36 yo F no PMH, PSH of lap band 10 years ago and laparoscopic sleeve gastrectomy 5 years ago at [facility] with Dr. Faith who presents with concern for acute cholecystitis w/ or w/o choledocholithiasis now s/p ERCP sphincterotomy and balloon sweep, now s/p Robot Faith non perf/gang    - regional  - CLD, IVF  - Pain and nausea control  - can be d/c home when pt prefers   - d/c w 7 day augmentin (total)

## 2021-06-13 NOTE — DISCHARGE NOTE PROVIDER - NSDCFUADDINST_GEN_ALL_CORE_FT
General Discharge Instructions:  Please resume all regular home medications unless specifically advised not to take a particular medication. Also, please take any new medications as prescribed.  Please get plenty of rest, continue to ambulate several times per day, and drink adequate amounts of fluids. Avoid lifting weights greater than 5-10 lbs until you follow-up with your surgeon, who will instruct you further regarding activity restrictions.  Avoid driving or operating heavy machinery while taking pain medications.  Please follow-up with your surgeon and Primary Care Provider (PCP) as advised.  Incision Care:  *Please call your doctor or nurse practitioner if you have increased pain, swelling, redness, or drainage from the incision site.  *Avoid swimming and baths until your follow-up appointment.  *You may shower, and wash surgical incisions with a mild soap and warm water. Gently pat the area dry.    Warning Signs:  Please call your doctor or nurse practitioner if you experience the following:  *You experience new chest pain, pressure, squeezing or tightness.  *New or worsening cough, shortness of breath, or wheeze.  *If you are vomiting and cannot keep down fluids or your medications.  *You are getting dehydrated due to continued vomiting, diarrhea, or other reasons. Signs of dehydration include dry mouth, rapid heartbeat, or feeling dizzy or faint when standing.  *You see blood or dark/black material when you vomit or have a bowel movement.  *You experience burning when you urinate, have blood in your urine, or experience a discharge.  *Your pain is not improving within 8-12 hours or is not gone within 24 hours. Call or return immediately if your pain is getting worse, changes location, or moves to your chest or back.  *You have shaking chills, or fever greater than 101.5 degrees Fahrenheit or 38 degrees Celsius.  *Any change in your symptoms, or any new symptoms that concern you.    Stay Hydrated:  - Avoid sugary drinks and caffeine   - Drink plenty of water, Gatorade and other fluids low in sugar that has electrolytes. Add soup and broth to your diet.   - drink about 2000 ml or more a day, if you do not have other medical condition that requires fluid restriction.   - You should be voiding clear yellow urine more than 4 times a day.

## 2021-06-13 NOTE — DISCHARGE NOTE PROVIDER - PROVIDER TOKENS
PROVIDER:[TOKEN:[[id number]],FOLLOWUP:[1 week]],PROVIDER:[TOKEN:[[id number]],FOLLOWUP:[1 week]]

## 2021-06-13 NOTE — DISCHARGE NOTE PROVIDER - CARE PROVIDER_API CALL
Sheridan Morales  SURGERY  [address], [place] [address]  Phone: [phone]  Fax: [phone]  Follow Up Time: 1 week    Howie Burnett  GASTROENTEROLOGY  [address], [place] [address]  Phone: [phone]  Fax: [phone]  Follow Up Time: 1 week

## 2021-06-14 ENCOUNTER — RESULT REVIEW (OUTPATIENT)
Age: 36
End: 2021-06-14

## 2021-06-14 PROCEDURE — 88304 TISSUE EXAM BY PATHOLOGIST: CPT | Mod: 26

## 2021-06-22 DIAGNOSIS — K80.50 CALCULUS OF BILE DUCT WITHOUT CHOLANGITIS OR CHOLECYSTITIS WITHOUT OBSTRUCTION: ICD-10-CM

## 2021-06-22 DIAGNOSIS — K82.A1 GANGRENE OF GALLBLADDER IN CHOLECYSTITIS: ICD-10-CM

## 2021-06-22 DIAGNOSIS — Z90.3 ACQUIRED ABSENCE OF STOMACH [PART OF]: ICD-10-CM

## 2021-06-22 DIAGNOSIS — K80.62 CALCULUS OF GALLBLADDER AND BILE DUCT WITH ACUTE CHOLECYSTITIS WITHOUT OBSTRUCTION: ICD-10-CM

## 2021-06-22 LAB — SURGICAL PATHOLOGY STUDY: SIGNIFICANT CHANGE UP

## 2021-06-22 PROCEDURE — 85730 THROMBOPLASTIN TIME PARTIAL: CPT

## 2021-06-22 PROCEDURE — 88304 TISSUE EXAM BY PATHOLOGIST: CPT

## 2021-06-22 PROCEDURE — C1889: CPT

## 2021-06-22 PROCEDURE — 86850 RBC ANTIBODY SCREEN: CPT

## 2021-06-22 PROCEDURE — U0005: CPT

## 2021-06-22 PROCEDURE — 85027 COMPLETE CBC AUTOMATED: CPT

## 2021-06-22 PROCEDURE — 96374 THER/PROPH/DIAG INJ IV PUSH: CPT

## 2021-06-22 PROCEDURE — 82248 BILIRUBIN DIRECT: CPT

## 2021-06-22 PROCEDURE — 85610 PROTHROMBIN TIME: CPT

## 2021-06-22 PROCEDURE — 86900 BLOOD TYPING SEROLOGIC ABO: CPT

## 2021-06-22 PROCEDURE — U0003: CPT

## 2021-06-22 PROCEDURE — 99285 EMERGENCY DEPT VISIT HI MDM: CPT | Mod: 25

## 2021-06-22 PROCEDURE — 85025 COMPLETE CBC W/AUTO DIFF WBC: CPT

## 2021-06-22 PROCEDURE — 81025 URINE PREGNANCY TEST: CPT

## 2021-06-22 PROCEDURE — 83735 ASSAY OF MAGNESIUM: CPT

## 2021-06-22 PROCEDURE — 86769 SARS-COV-2 COVID-19 ANTIBODY: CPT

## 2021-06-22 PROCEDURE — 36415 COLL VENOUS BLD VENIPUNCTURE: CPT

## 2021-06-22 PROCEDURE — C1769: CPT

## 2021-06-22 PROCEDURE — 82247 BILIRUBIN TOTAL: CPT

## 2021-06-22 PROCEDURE — S2900: CPT

## 2021-06-22 PROCEDURE — 76705 ECHO EXAM OF ABDOMEN: CPT

## 2021-06-22 PROCEDURE — 80053 COMPREHEN METABOLIC PANEL: CPT

## 2021-06-22 PROCEDURE — 84100 ASSAY OF PHOSPHORUS: CPT

## 2021-06-22 PROCEDURE — 81001 URINALYSIS AUTO W/SCOPE: CPT

## 2021-06-22 PROCEDURE — 86901 BLOOD TYPING SEROLOGIC RH(D): CPT

## 2021-06-22 PROCEDURE — 96375 TX/PRO/DX INJ NEW DRUG ADDON: CPT

## 2021-06-22 PROCEDURE — 83690 ASSAY OF LIPASE: CPT

## 2022-11-16 PROBLEM — K80.20 CALCULUS OF GALLBLADDER WITHOUT CHOLECYSTITIS WITHOUT OBSTRUCTION: Chronic | Status: ACTIVE | Noted: 2021-06-10

## 2022-11-17 ENCOUNTER — NON-APPOINTMENT (OUTPATIENT)
Age: 37
End: 2022-11-17

## 2022-12-01 ENCOUNTER — APPOINTMENT (OUTPATIENT)
Dept: BARIATRICS | Facility: CLINIC | Age: 37
End: 2022-12-01

## 2022-12-01 VITALS
HEART RATE: 65 BPM | BODY MASS INDEX: 36.7 KG/M2 | OXYGEN SATURATION: 99 % | HEIGHT: 64 IN | DIASTOLIC BLOOD PRESSURE: 79 MMHG | SYSTOLIC BLOOD PRESSURE: 114 MMHG | TEMPERATURE: 97.3 F | WEIGHT: 215 LBS

## 2022-12-01 PROCEDURE — 99204 OFFICE O/P NEW MOD 45 MIN: CPT

## 2022-12-02 NOTE — HISTORY OF PRESENT ILLNESS
[de-identified] : Patient is a 37 year old F with a PMHx of obesity(BMI 36), GERD and PSHx of lap band, lap band conversion to VSG w/ Dr.George Faith and cholecystectomy who presents here today feeling well for the initial evaluation of GERD and bariatric consult. Her PCP is . Reports that her lap band flipped 3 times during her pregnancy about 3 years ago, at which time she had to get a lap band removal which was then converted to VSG. Currently, she experiences heartburn and chest pain but denies regurgitation and dysphagia. Has not yet consulted with anyone for acid reflux prior to this visit. She was on iron infusion due to anemia. On Lexapro and 40 mg Prilosec every morning. With a BMI of 36, have discussed both surgical and non surgical weight loss treatment plan including bariatric surgery, fundoplication and medical weight loss. At this time, will get an EGD w/ Bravo and UGI series to evaluate the extent of her reported GERD. Will reassess after the above tests are completed and will discuss the treatment plan.  \par \par We discussed at length surgical and non-surgical options and that non surgical approaches are unlikely to lead to long term, sustained weight loss. We also discussed that surgery alone is unlikely to be successful but should rather be seen as a tool for weight loss to be integrated with physical activity and nutritional counseling. The patient verbalized understanding and agrees to proceed with the evaluation. All risks of surgery were explained to the patient including the risks of leaks, infections, blood clots and death.\par \par

## 2022-12-02 NOTE — ADDENDUM
[FreeTextEntry1] : Documented by Susan Grande acting as a scribe for SOWMYA Guzman on 12/01/2022\par

## 2022-12-02 NOTE — END OF VISIT
[Time Spent: ___ minutes] : I have spent [unfilled] minutes of time on the encounter. [FreeTextEntry3] : All medical record entries made by the Scribe were at my, SOWMYA Guzman , direction and personally dictated by me on 12/01/2022 . I have reviewed the chart and agree that the record accurately reflects my personal performance of the history, physical exam, assessment and plan. I have also personally directed, reviewed, and agreed with the chart.\par

## 2022-12-02 NOTE — ASSESSMENT
[FreeTextEntry1] : Patient is a 37 year old F with a PMHx of obesity(BMI 36), GERD and PSHx of lap band, lap band conversion to VSG w/ Dr.George Faith and cholecystectomy who presents here today feeling well for the initial evaluation of GERD and bariatric consult. Her PCP is . Reports that her lap band flipped 3 times during her pregnancy about 3 years ago, at which time she had to get a lap band removal which was then converted to VSG. Currently, she experiences heartburn and chest pain but denies regurgitation and dysphagia. Has not yet consulted with anyone for acid reflux prior to this visit. She was on iron infusion due to anemia. On Lexapro and 40 mg Prilosec every morning. With a BMI of 36, have discussed both surgical and non surgical weight loss treatment plan including bariatric surgery, fundoplication and medical weight loss. At this time, will get an EGD w/ Bravo and UGI series to evaluate the extent of her reported GERD. Will reassess after the above tests are completed and will discuss the treatment plan.  \par \par We discussed at length surgical and non-surgical options and that non surgical approaches are unlikely to lead to long term, sustained weight loss. We also discussed that surgery alone is unlikely to be successful but should rather be seen as a tool for weight loss to be integrated with physical activity and nutritional counseling. The patient verbalized understanding and agrees to proceed with the evaluation. All risks of surgery were explained to the patient including the risks of leaks, infections, blood clots and death.\par \par

## 2022-12-28 ENCOUNTER — APPOINTMENT (OUTPATIENT)
Dept: PULMONOLOGY | Facility: CLINIC | Age: 37
End: 2022-12-28

## 2022-12-29 ENCOUNTER — OUTPATIENT (OUTPATIENT)
Dept: OUTPATIENT SERVICES | Facility: HOSPITAL | Age: 37
LOS: 1 days | End: 2022-12-29
Payer: MEDICAID

## 2022-12-29 ENCOUNTER — APPOINTMENT (OUTPATIENT)
Dept: RADIOLOGY | Facility: HOSPITAL | Age: 37
End: 2022-12-29
Payer: MEDICAID

## 2022-12-29 ENCOUNTER — RESULT REVIEW (OUTPATIENT)
Age: 37
End: 2022-12-29

## 2022-12-29 DIAGNOSIS — Z98.84 BARIATRIC SURGERY STATUS: Chronic | ICD-10-CM

## 2022-12-29 DIAGNOSIS — Z90.3 ACQUIRED ABSENCE OF STOMACH [PART OF]: Chronic | ICD-10-CM

## 2022-12-29 PROCEDURE — 74240 X-RAY XM UPR GI TRC 1CNTRST: CPT | Mod: 26

## 2022-12-29 PROCEDURE — 74240 X-RAY XM UPR GI TRC 1CNTRST: CPT

## 2023-01-18 ENCOUNTER — APPOINTMENT (OUTPATIENT)
Dept: PULMONOLOGY | Facility: CLINIC | Age: 38
End: 2023-01-18
Payer: MEDICAID

## 2023-01-18 VITALS
DIASTOLIC BLOOD PRESSURE: 79 MMHG | HEART RATE: 81 BPM | OXYGEN SATURATION: 97 % | BODY MASS INDEX: 37.05 KG/M2 | HEIGHT: 64 IN | WEIGHT: 217 LBS | SYSTOLIC BLOOD PRESSURE: 122 MMHG

## 2023-01-18 DIAGNOSIS — Z86.16 PERSONAL HISTORY OF COVID-19: ICD-10-CM

## 2023-01-18 DIAGNOSIS — R06.83 SNORING: ICD-10-CM

## 2023-01-18 DIAGNOSIS — R06.00 DYSPNEA, UNSPECIFIED: ICD-10-CM

## 2023-01-18 PROCEDURE — 71046 X-RAY EXAM CHEST 2 VIEWS: CPT

## 2023-01-18 PROCEDURE — 99204 OFFICE O/P NEW MOD 45 MIN: CPT | Mod: 25

## 2023-01-18 RX ORDER — OMEPRAZOLE MAGNESIUM 10 MG/1
10 GRANULE, DELAYED RELEASE ORAL
Refills: 0 | Status: ACTIVE | COMMUNITY
Start: 2023-01-18

## 2023-01-18 RX ORDER — ESCITALOPRAM OXALATE 10 MG/1
10 TABLET, FILM COATED ORAL DAILY
Qty: 30 | Refills: 5 | Status: ACTIVE | COMMUNITY
Start: 2023-01-18

## 2023-01-18 NOTE — HISTORY OF PRESENT ILLNESS
[Never] : never [Awakes Unrefreshed] : awakes unrefreshed [Awakes with Dry Mouth] : awakes with dry mouth [Awakes with Headache] : awakes with headache [Daytime Somnolence] : daytime somnolence [Fatigue] : fatigue [Frequent Nocturnal Awakening] : frequent nocturnal awakening [Recent  Weight Gain] : recent weight gain [Snoring] : snoring [Unusual Movements] : unusual movements [Vivid dreams] : vivid dreams [TextBox_4] : She is complaining of fatigue and snoring.  She also had COVID twice.  She is short of breath when she goes up a hill but not on the flat surface [Cataplexy] : denies cataplexy [Difficulty Initiating Sleep] : does not have difficulty initiating sleep [Difficulty Maintaining Sleep] : does not have difficulty maintaining sleep [Dysesthesias] : denies dysesthesias [Hypersomnolence] : denies hypersomnolence [Hypnagogic Hallucinations] : denies hypnagogic hallucinations [Hypnopompic Hallucinations] : denies hypnopompic hallucinations [Nonrestorative Sleep] : denies nonrestorative sleep [Sleep Paralysis] : no history of sleep paralysis [Tired while Driving] : not tired while driving [Witnessed Apneas] : no witnessed apneas

## 2023-01-18 NOTE — ASSESSMENT
[FreeTextEntry1] : snoring\par I discussed the short and long term health effect of the obstructive seep apnea with the patient. These effects include, but not limited to, uncontrolled hypertension, CAD, arrhythmias, sudden death, CVA, and pulmonary hypertension. I advised the patient to avoid sedatives, narcotics, driving, and sleeping pills in the meantime. I discussed the therapeutic options including but not limited to CPAP, surgery, and oral appliance. Further recommendations will follow after sleep study.\par  FORMAN\par Dated to her obstructive sleep apnea but we will follow-up with echocardiogram to rule out underlying pulmonary hypertension or any Motion abnormality\par \par Status post COVID\par \par The baseline oxygen saturation is normal.  The chest x-ray was clear as a chest x-ray.

## 2023-01-18 NOTE — REVIEW OF SYSTEMS
[Fever] : no fever [Fatigue] : fatigue [Recent Wt Gain (___ Lbs)] : ~T recent [unfilled] lb weight gain [Chills] : no chills [Poor Appetite] : no poor appetite [Cough] : no cough [Hemoptysis] : no hemoptysis [Chest Tightness] : no chest tightness [Frequent URIs] : no frequent URIs [Sputum] : no sputum [Dyspnea] : no dyspnea [Pleuritic Pain] : no pleuritic pain [Wheezing] : no wheezing [A.M. Dry Mouth] : no a.m. dry mouth [SOB on Exertion] : sob on exertion [Negative] : Endocrine

## 2023-02-06 ENCOUNTER — TRANSCRIPTION ENCOUNTER (OUTPATIENT)
Age: 38
End: 2023-02-06

## 2023-02-06 ENCOUNTER — OUTPATIENT (OUTPATIENT)
Dept: OUTPATIENT SERVICES | Facility: HOSPITAL | Age: 38
LOS: 1 days | Discharge: HOME | End: 2023-02-06
Payer: MEDICAID

## 2023-02-06 ENCOUNTER — RESULT REVIEW (OUTPATIENT)
Age: 38
End: 2023-02-06

## 2023-02-06 DIAGNOSIS — Z90.3 ACQUIRED ABSENCE OF STOMACH [PART OF]: Chronic | ICD-10-CM

## 2023-02-06 DIAGNOSIS — Z98.84 BARIATRIC SURGERY STATUS: Chronic | ICD-10-CM

## 2023-02-06 PROCEDURE — C1889: CPT

## 2023-02-06 PROCEDURE — 43239 EGD BIOPSY SINGLE/MULTIPLE: CPT

## 2023-02-06 PROCEDURE — 91035 G-ESOPH REFLX TST W/ELECTROD: CPT | Mod: 26

## 2023-02-06 PROCEDURE — 88305 TISSUE EXAM BY PATHOLOGIST: CPT

## 2023-02-06 PROCEDURE — 88305 TISSUE EXAM BY PATHOLOGIST: CPT | Mod: 26

## 2023-02-06 DEVICE — CAPSULE PILLCAM SB-3: Type: IMPLANTABLE DEVICE | Status: FUNCTIONAL

## 2023-02-07 LAB — SURGICAL PATHOLOGY STUDY: SIGNIFICANT CHANGE UP

## 2023-02-13 ENCOUNTER — OUTPATIENT (OUTPATIENT)
Dept: OUTPATIENT SERVICES | Facility: HOSPITAL | Age: 38
LOS: 1 days | End: 2023-02-13
Payer: MEDICAID

## 2023-02-13 ENCOUNTER — APPOINTMENT (OUTPATIENT)
Dept: SLEEP CENTER | Facility: HOME HEALTH | Age: 38
End: 2023-02-13
Payer: MEDICAID

## 2023-02-13 DIAGNOSIS — Z98.84 BARIATRIC SURGERY STATUS: Chronic | ICD-10-CM

## 2023-02-13 DIAGNOSIS — Z90.3 ACQUIRED ABSENCE OF STOMACH [PART OF]: Chronic | ICD-10-CM

## 2023-02-13 PROCEDURE — 95800 SLP STDY UNATTENDED: CPT | Mod: 26

## 2023-02-13 PROCEDURE — 95800 SLP STDY UNATTENDED: CPT

## 2023-02-15 DIAGNOSIS — G47.33 OBSTRUCTIVE SLEEP APNEA (ADULT) (PEDIATRIC): ICD-10-CM

## 2023-02-16 ENCOUNTER — APPOINTMENT (OUTPATIENT)
Dept: BARIATRICS | Facility: CLINIC | Age: 38
End: 2023-02-16
Payer: MEDICAID

## 2023-02-16 PROCEDURE — 99214 OFFICE O/P EST MOD 30 MIN: CPT | Mod: 95

## 2023-02-21 ENCOUNTER — OUTPATIENT (OUTPATIENT)
Dept: OUTPATIENT SERVICES | Facility: HOSPITAL | Age: 38
LOS: 1 days | End: 2023-02-21

## 2023-02-21 DIAGNOSIS — Z98.84 BARIATRIC SURGERY STATUS: Chronic | ICD-10-CM

## 2023-02-21 DIAGNOSIS — Z86.16 PERSONAL HISTORY OF COVID-19: ICD-10-CM

## 2023-02-21 DIAGNOSIS — Z90.3 ACQUIRED ABSENCE OF STOMACH [PART OF]: Chronic | ICD-10-CM

## 2023-02-21 NOTE — ASSESSMENT
[FreeTextEntry1] : Appointment provided through telehealth 2 way AV\par \par Patient is a 37 year old F with a PMHx of obesity(BMI 36), GERD and PSHx of lap band, lap band conversion to VSG w/ Dr.George Faith and cholecystectomy who presents here today feeling well for the initial evaluation of GERD and bariatric consult. Her PCP is . Reports that her lap band flipped 3 times during her pregnancy about 3 years ago, at which time she had to get a lap band removal which was then converted to VSG. Currently, she experiences heartburn and chest pain but denies regurgitation and dysphagia. Has not yet consulted with anyone for acid reflux prior to this visit. She was on iron infusion due to anemia. On Lexapro and 40 mg Prilosec every morning. With a BMI of 36, have discussed both surgical and non surgical weight loss treatment plan including bariatric surgery, fundoplication and medical weight loss. At this time, will get an EGD w/ Bravo and UGI series to evaluate the extent of her reported GERD. Will reassess after the above tests are completed and will discuss the treatment plan. \par \par EGD revealed 4 cm hiatal hernia. Bravo shows DMS of 29. Discussed with patient possible options oncluding conversion to RYGB and medical weight loss.

## 2023-02-21 NOTE — HISTORY OF PRESENT ILLNESS
[Home] : at home, [unfilled] , at the time of the visit. [Medical Office: ([facility])___] : at the medical office located in  [Verbal consent obtained from patient] : the patient, [unfilled] [de-identified] : Appointment provided through telehealth 2 way AV\par \par Patient is a 37 year old F with a PMHx of obesity(BMI 36), GERD and PSHx of lap band, lap band conversion to VSG w/ Dr.George Faith and cholecystectomy who presents here today feeling well for the initial evaluation of GERD and bariatric consult. Her PCP is . Reports that her lap band flipped 3 times during her pregnancy about 3 years ago, at which time she had to get a lap band removal which was then converted to VSG. Currently, she experiences heartburn and chest pain but denies regurgitation and dysphagia. Has not yet consulted with anyone for acid reflux prior to this visit. She was on iron infusion due to anemia. On Lexapro and 40 mg Prilosec every morning. With a BMI of 36, have discussed both surgical and non surgical weight loss treatment plan including bariatric surgery, fundoplication and medical weight loss. At this time, will get an EGD w/ Bravo and UGI series to evaluate the extent of her reported GERD. Will reassess after the above tests are completed and will discuss the treatment plan. \par \par EGD revealed 4 cm hiatal hernia. Bravo shows DMS of 29. Discussed with patient possible options oncluding conversion to RYGB and medical weight loss.

## 2023-03-09 ENCOUNTER — FORM ENCOUNTER (OUTPATIENT)
Age: 38
End: 2023-03-09

## 2023-03-10 ENCOUNTER — OUTPATIENT (OUTPATIENT)
Dept: OUTPATIENT SERVICES | Facility: HOSPITAL | Age: 38
LOS: 1 days | End: 2023-03-10
Payer: MEDICAID

## 2023-03-10 DIAGNOSIS — Z98.84 BARIATRIC SURGERY STATUS: Chronic | ICD-10-CM

## 2023-03-10 DIAGNOSIS — Z90.3 ACQUIRED ABSENCE OF STOMACH [PART OF]: Chronic | ICD-10-CM

## 2023-03-10 DIAGNOSIS — Z86.16 PERSONAL HISTORY OF COVID-19: ICD-10-CM

## 2023-03-10 PROCEDURE — 93306 TTE W/DOPPLER COMPLETE: CPT | Mod: 26

## 2023-03-10 PROCEDURE — 93306 TTE W/DOPPLER COMPLETE: CPT

## 2023-05-10 ENCOUNTER — APPOINTMENT (OUTPATIENT)
Dept: BARIATRICS | Facility: CLINIC | Age: 38
End: 2023-05-10
Payer: MEDICAID

## 2023-05-10 VITALS
HEIGHT: 64 IN | TEMPERATURE: 97.4 F | DIASTOLIC BLOOD PRESSURE: 84 MMHG | SYSTOLIC BLOOD PRESSURE: 122 MMHG | HEART RATE: 66 BPM | WEIGHT: 223.13 LBS | OXYGEN SATURATION: 97 % | BODY MASS INDEX: 38.09 KG/M2

## 2023-05-10 PROCEDURE — 99213 OFFICE O/P EST LOW 20 MIN: CPT

## 2023-05-17 NOTE — HISTORY OF PRESENT ILLNESS
[de-identified] : Ambar Eden, is a 38 yo female reporting today for a follow up visit. Advised the use of Pepcid qhs. Recommended patient to lose weight and to proceed with surgical scheduling for a bypass.

## 2023-05-17 NOTE — ASSESSMENT
[FreeTextEntry1] : Ambar Eden, is a 38 yo female reporting today for a follow up visit. Advised the use of Pepcid qhs. Recommended patient to lose weight and to proceed with surgical scheduling for a bypass. \par

## 2023-05-17 NOTE — ADDENDUM
[FreeTextEntry1] : All medical record entries made by the Scribe were at my, Dr. Adrian Cabrera direction and personally dictated by me on 5/10/23. I have reviewed the chart and agree that the record accurately reflects my personal performance of the history, physical exam, assessment and plan. I have also personally directed, reviewed, and agreed with the chart. \par

## 2023-05-23 ENCOUNTER — APPOINTMENT (OUTPATIENT)
Dept: BARIATRICS | Facility: CLINIC | Age: 38
End: 2023-05-23
Payer: MEDICAID

## 2023-05-23 PROCEDURE — 97802 MEDICAL NUTRITION INDIV IN: CPT | Mod: NC,95

## 2023-05-29 ENCOUNTER — TRANSCRIPTION ENCOUNTER (OUTPATIENT)
Age: 38
End: 2023-05-29

## 2023-06-06 ENCOUNTER — APPOINTMENT (OUTPATIENT)
Dept: BARIATRICS | Facility: CLINIC | Age: 38
End: 2023-06-06

## 2023-06-07 ENCOUNTER — APPOINTMENT (OUTPATIENT)
Dept: BARIATRICS | Facility: CLINIC | Age: 38
End: 2023-06-07
Payer: MEDICAID

## 2023-06-07 VITALS — BODY MASS INDEX: 39.78 KG/M2 | HEIGHT: 64 IN | WEIGHT: 233 LBS

## 2023-06-07 DIAGNOSIS — Z98.84 BARIATRIC SURGERY STATUS: ICD-10-CM

## 2023-06-07 PROCEDURE — 99214 OFFICE O/P EST MOD 30 MIN: CPT | Mod: 95

## 2023-06-07 NOTE — END OF VISIT
[FreeTextEntry3] : All medical record entries made by the Scribe were at my, WU Amaro , direction and personally dictated by me on 06/07/2023 . I have reviewed the chart and agree that the record accurately reflects my personal performance of the history, physical exam, assessment and plan. I have also personally directed, reviewed, and agreed with the chart.\par

## 2023-06-07 NOTE — PLAN
[FreeTextEntry1] : Patient to receive at least 3 iron infusions prior to the scheduled surgery. She is scheduled for VSG conversion to DS on 6/19/23.

## 2023-06-07 NOTE — HISTORY OF PRESENT ILLNESS
[Medical Office: ([facility])___] : at the medical office located in  [Verbal consent obtained from patient] : the patient, [unfilled] [de-identified] : Ambar Eden returns today via telehealth for final bariatric visit. She is a 37 year old F s/p VSG who is interested in a cnversion surgery to DS. Last labs were significant for iron deficiency anemia. Have advised the patient to receive at least 3 iron infusions prior to the scheduled surgery. She is scheduled for an iron infusion today. Patient is scheduled for VSG conversion to DS on 6/19/23.

## 2023-06-16 VITALS
DIASTOLIC BLOOD PRESSURE: 80 MMHG | TEMPERATURE: 97 F | RESPIRATION RATE: 16 BRPM | SYSTOLIC BLOOD PRESSURE: 123 MMHG | HEIGHT: 64 IN | WEIGHT: 288.14 LBS | HEART RATE: 66 BPM | OXYGEN SATURATION: 100 %

## 2023-06-16 NOTE — PATIENT PROFILE ADULT - FALL HARM RISK - UNIVERSAL INTERVENTIONS
Bed in lowest position, wheels locked, appropriate side rails in place/Call bell, personal items and telephone in reach/Instruct patient to call for assistance before getting out of bed or chair/Non-slip footwear when patient is out of bed/[place] to call system/Physically safe environment - no spills, clutter or unnecessary equipment/Purposeful Proactive Rounding/Room/bathroom lighting operational, light cord in reach

## 2023-06-18 ENCOUNTER — TRANSCRIPTION ENCOUNTER (OUTPATIENT)
Age: 38
End: 2023-06-18

## 2023-06-19 ENCOUNTER — TRANSCRIPTION ENCOUNTER (OUTPATIENT)
Age: 38
End: 2023-06-19

## 2023-06-19 ENCOUNTER — APPOINTMENT (OUTPATIENT)
Dept: BARIATRICS | Facility: HOSPITAL | Age: 38
End: 2023-06-19
Payer: MEDICAID

## 2023-06-19 ENCOUNTER — INPATIENT (INPATIENT)
Facility: HOSPITAL | Age: 38
LOS: 0 days | Discharge: HOME | DRG: 354 | End: 2023-06-20
Attending: SURGERY | Admitting: SURGERY
Payer: MEDICAID

## 2023-06-19 DIAGNOSIS — E66.01 MORBID (SEVERE) OBESITY DUE TO EXCESS CALORIES: ICD-10-CM

## 2023-06-19 DIAGNOSIS — Z90.3 ACQUIRED ABSENCE OF STOMACH [PART OF]: Chronic | ICD-10-CM

## 2023-06-19 DIAGNOSIS — Z98.84 BARIATRIC SURGERY STATUS: Chronic | ICD-10-CM

## 2023-06-19 LAB
BLD GP AB SCN SERPL QL: NEGATIVE — SIGNIFICANT CHANGE UP
HCT VFR BLD CALC: 34.7 % — SIGNIFICANT CHANGE UP (ref 34.5–45)
HGB BLD-MCNC: 10.2 G/DL — LOW (ref 11.5–15.5)
MCHC RBC-ENTMCNC: 21.2 PG — LOW (ref 27–34)
MCHC RBC-ENTMCNC: 29.4 GM/DL — LOW (ref 32–36)
MCV RBC AUTO: 72.1 FL — LOW (ref 80–100)
NRBC # BLD: 0 /100 WBCS — SIGNIFICANT CHANGE UP (ref 0–0)
PLATELET # BLD AUTO: 228 K/UL — SIGNIFICANT CHANGE UP (ref 150–400)
RBC # BLD: 4.81 M/UL — SIGNIFICANT CHANGE UP (ref 3.8–5.2)
RBC # FLD: 20.7 % — HIGH (ref 10.3–14.5)
RH IG SCN BLD-IMP: POSITIVE — SIGNIFICANT CHANGE UP
WBC # BLD: 14.2 K/UL — HIGH (ref 3.8–10.5)
WBC # FLD AUTO: 14.2 K/UL — HIGH (ref 3.8–10.5)

## 2023-06-19 PROCEDURE — 43848 REVJ OPEN GSTR RSTCV PX: CPT

## 2023-06-19 PROCEDURE — 43332 TRANSAB ESOPH HIAT HERN RPR: CPT

## 2023-06-19 DEVICE — STAPLER ETHICON ECHELON ENDOPATH 60MM: Type: IMPLANTABLE DEVICE | Status: FUNCTIONAL

## 2023-06-19 DEVICE — CLIP APPLIER ETHICON LIGAMAX 5MM: Type: IMPLANTABLE DEVICE | Status: FUNCTIONAL

## 2023-06-19 DEVICE — STAPLER ETHICON ECHELON ENDOPATH GRIP SURFACE 60MM WHITE RELOAD: Type: IMPLANTABLE DEVICE | Status: FUNCTIONAL

## 2023-06-19 DEVICE — STAPLER ETHICON GST ECHELON 60MM BLUE RELOAD: Type: IMPLANTABLE DEVICE | Status: FUNCTIONAL

## 2023-06-19 RX ORDER — ESCITALOPRAM OXALATE 10 MG/1
40 TABLET, FILM COATED ORAL
Qty: 0 | Refills: 0 | DISCHARGE

## 2023-06-19 RX ORDER — ACETAMINOPHEN 500 MG
1000 TABLET ORAL ONCE
Refills: 0 | Status: COMPLETED | OUTPATIENT
Start: 2023-06-19 | End: 2023-06-19

## 2023-06-19 RX ORDER — ENOXAPARIN SODIUM 100 MG/ML
30 INJECTION SUBCUTANEOUS ONCE
Refills: 0 | Status: COMPLETED | OUTPATIENT
Start: 2023-06-19 | End: 2023-06-19

## 2023-06-19 RX ORDER — PANTOPRAZOLE SODIUM 20 MG/1
40 TABLET, DELAYED RELEASE ORAL DAILY
Refills: 0 | Status: DISCONTINUED | OUTPATIENT
Start: 2023-06-19 | End: 2023-06-20

## 2023-06-19 RX ORDER — ONDANSETRON 8 MG/1
4 TABLET, FILM COATED ORAL EVERY 8 HOURS
Refills: 0 | Status: DISCONTINUED | OUTPATIENT
Start: 2023-06-19 | End: 2023-06-20

## 2023-06-19 RX ORDER — HYDROMORPHONE HYDROCHLORIDE 2 MG/ML
0.5 INJECTION INTRAMUSCULAR; INTRAVENOUS; SUBCUTANEOUS
Refills: 0 | Status: DISCONTINUED | OUTPATIENT
Start: 2023-06-19 | End: 2023-06-20

## 2023-06-19 RX ORDER — ESCITALOPRAM OXALATE 10 MG/1
40 TABLET, FILM COATED ORAL DAILY
Refills: 0 | Status: DISCONTINUED | OUTPATIENT
Start: 2023-06-19 | End: 2023-06-20

## 2023-06-19 RX ORDER — SODIUM CHLORIDE 9 MG/ML
1000 INJECTION, SOLUTION INTRAVENOUS
Refills: 0 | Status: DISCONTINUED | OUTPATIENT
Start: 2023-06-19 | End: 2023-06-20

## 2023-06-19 RX ORDER — ACETAMINOPHEN 500 MG
650 TABLET ORAL EVERY 6 HOURS
Refills: 0 | Status: DISCONTINUED | OUTPATIENT
Start: 2023-06-19 | End: 2023-06-20

## 2023-06-19 RX ORDER — THIAMINE MONONITRATE (VIT B1) 100 MG
500 TABLET ORAL DAILY
Refills: 0 | Status: DISCONTINUED | OUTPATIENT
Start: 2023-06-19 | End: 2023-06-20

## 2023-06-19 RX ORDER — SCOPALAMINE 1 MG/3D
1 PATCH, EXTENDED RELEASE TRANSDERMAL ONCE
Refills: 0 | Status: COMPLETED | OUTPATIENT
Start: 2023-06-19 | End: 2023-06-19

## 2023-06-19 RX ADMIN — Medication 1000 MILLIGRAM(S): at 19:25

## 2023-06-19 RX ADMIN — PANTOPRAZOLE SODIUM 40 MILLIGRAM(S): 20 TABLET, DELAYED RELEASE ORAL at 20:14

## 2023-06-19 RX ADMIN — Medication 105 MILLIGRAM(S): at 22:43

## 2023-06-19 RX ADMIN — SCOPALAMINE 1 PATCH: 1 PATCH, EXTENDED RELEASE TRANSDERMAL at 19:33

## 2023-06-19 RX ADMIN — Medication 400 MILLIGRAM(S): at 19:10

## 2023-06-19 RX ADMIN — SCOPALAMINE 1 PATCH: 1 PATCH, EXTENDED RELEASE TRANSDERMAL at 12:06

## 2023-06-19 RX ADMIN — ENOXAPARIN SODIUM 30 MILLIGRAM(S): 100 INJECTION SUBCUTANEOUS at 12:06

## 2023-06-19 RX ADMIN — Medication 1000 MILLIGRAM(S): at 12:07

## 2023-06-19 NOTE — BRIEF OPERATIVE NOTE - OPERATION/FINDINGS
Laparoscopic conversion of sleeve gastrectomy to modified duodenal switch and primary hiatal hernia repair:   - Liver adhesions  - no re-sleeving  - esophageal dilation  - clips at duodenal stump Laparoscopic conversion of sleeve gastrectomy to modified duodenal switch and primary hiatal hernia repair: Antibiotics and DVT prophylaxis on board. Time out done. All trocars placed under direct visualization. Moderate liver adhesions taken down with Ligasure device.  Hiatal hernia repaired using non-absorbable quill after mobilizing at least 3cm of intra-abdominal   - Liver adhesions  - no re-sleeving  - esophageal dilation  - clips at duodenal stump Laparoscopic conversion of sleeve gastrectomy to modified duodenal switch and primary hiatal hernia repair: Antibiotics and DVT prophylaxis on board. Time out done. All trocars placed under direct visualization. Moderate liver adhesions taken down with Ligasure device.  Hiatal hernia repaired using non-absorbable quill after mobilizing at least 3cm of intra-abdominal esophagus. Of note, esophageal dilation noted.  Fatty liver noted. Duodenal division done with blue buttressed loads. Clips placed at duodenal stump and hemostasis attained. No needed for re-sleeving. DI anastomosis created at 300cm from ICV tension free and leak tested negative with methylene blue dye. Anterior lembert layer added to anastomosis. Antrum fixated to afferent limb to prevent torsion. Supraumbilical fascia closed with vicryl and skin closed with monocryl/dermabond. Patient extubated and sent to PACU in stable condition.

## 2023-06-19 NOTE — H&P ADULT - HISTORY OF PRESENT ILLNESS
36 y/o female patient with iron deficiency anemia,  morbid obesity (s/p sleeve gastrectomy) and hiatal hernia; comes in for conversion to modified duodenal switch and admission to [facility] for perioperative care. Mild OLIVER that does not require CPAP, denies persistent reflux and any other GI complains.     Home meds:Lexapro, Prilosec  ALL: NKDA  Psx: Sleeve gastrectomy   Toxic: denies    Physical Exam:  -GEN: AAOx3, NAD  - ABD: soft and depressible, nontender, nondistended.     Hgb: 9.4  HCt: 32  Cr: 0.56    CXR: WNL  UGI: small hiatal hernia and reflux  EGD: 3cm hiatal hernia with adequate size sleeve.

## 2023-06-19 NOTE — BRIEF OPERATIVE NOTE - NSICDXBRIEFPROCEDURE_GEN_ALL_CORE_FT
PROCEDURES:  Laparoscopic conversion of sleeve gastrectomy to single anastomosis duodenoileostomy 19-Jun-2023 18:10:16  Bhavana Reza  Laparoscopic herniorrhaphy of hiatal hernia 19-Jun-2023 18:10:23  Bhavana Reza

## 2023-06-19 NOTE — H&P ADULT - NSHPLABSRESULTS_GEN_ALL_CORE
Hgb: 9.4  HCt: 32  Cr: 0.56    CXR: WNL  UGI: small hiatal hernia and reflux  EGD: 3cm hiatal hernia with adequate size sleeve.

## 2023-06-19 NOTE — BRIEF OPERATIVE NOTE - NSICDXBRIEFPOSTOP_GEN_ALL_CORE_FT
End of Shift Note: Medical  -shortness of breath, chest discomfort, cough     Pain Management: Last Pain Score:  0/10.                                      Pain medication:  no pain medication given   Diet: Cardiac  Bowel Function:  Normal, passing gas  LBM:  PTA  Activity Up with 1 assist, Gait belt and Cane  Number of times ambulated:  4.  Distance:  125 ft, to and from bathroom twice.  Significant Events:   -VQ scan- showed no PE  - ultrasound of gallbladder, pancreas and liver to be done today   -occult stool still NEEDED   LDAs: peripheral IV  Discharge:  Anticipated discharge date:  TBD                      Disposition: home with home PT, OT, SN   POST-OP DIAGNOSIS:  Morbid obesity 19-Jun-2023 18:10:46  Bhavana Reza  Hiatal hernia 19-Jun-2023 18:10:51  Bhavana Reza

## 2023-06-19 NOTE — PROGRESS NOTE ADULT - SUBJECTIVE AND OBJECTIVE BOX
Procedure: Laparoscopic conversion of sleeve gastrectomy to modified duodenal switch and hiatal hernia repair  Surgeon: Dr. Cabrera    S: Pt seen and examined in the PACU. She has no acute complaints. Pain controlled with medication. She has not had anything to drink yet. Denies CP, SOB, FORMAN, calf tenderness or edema, nausea, emesis, or fevers.    O:  T(C): 36.6 (06-19-23 @ 21:00), Max: 36.6 (06-19-23 @ 21:00)  T(F): 97.8 (06-19-23 @ 21:00), Max: 97.8 (06-19-23 @ 21:00)  HR: 78 (06-19-23 @ 21:00) (75 - 112)  BP: 126/80 (06-19-23 @ 21:00) (124/73 - 164/89)  RR: 18 (06-19-23 @ 21:00) (13 - 18)  SpO2: 96% (06-19-23 @ 21:00) (95% - 98%)  Wt(kg): --        Gen: NAD, resting comfortably in bed, 2L nasal cannula  C/V: NSR  Pulm: Nonlabored breathing, no respiratory distress  Abd: soft, NT/ND Incision: all 5 incisions are c/d/i  Extrem: WWP, no calf edema or tenderness, SCDs in place      A/P: 36 y/o female patient with iron deficiency anemia,  morbid obesity (s/p sleeve gastrectomy) and hiatal hernia;  s/p lap conversion of sleeve to MDS and HHR 06/19/23.    - Regional  - BCLD/IVF  - Pain/nausea control PRN  - IS/OOBA/ SCD's  - Routine labs  - Nutrition AM

## 2023-06-19 NOTE — BRIEF OPERATIVE NOTE - NSICDXBRIEFPREOP_GEN_ALL_CORE_FT
PRE-OP DIAGNOSIS:  Morbid obesity 19-Jun-2023 18:10:31  Bhavana Reza  Hiatal hernia 19-Jun-2023 18:10:37  Bhavana Reza

## 2023-06-20 ENCOUNTER — TRANSCRIPTION ENCOUNTER (OUTPATIENT)
Age: 38
End: 2023-06-20

## 2023-06-20 VITALS
HEART RATE: 73 BPM | RESPIRATION RATE: 18 BRPM | TEMPERATURE: 99 F | DIASTOLIC BLOOD PRESSURE: 74 MMHG | OXYGEN SATURATION: 96 % | SYSTOLIC BLOOD PRESSURE: 120 MMHG

## 2023-06-20 LAB
ANION GAP SERPL CALC-SCNC: 10 MMOL/L — SIGNIFICANT CHANGE UP (ref 5–17)
ANISOCYTOSIS BLD QL: SLIGHT — SIGNIFICANT CHANGE UP
BASOPHILS # BLD AUTO: 0 K/UL — SIGNIFICANT CHANGE UP (ref 0–0.2)
BASOPHILS NFR BLD AUTO: 0 % — SIGNIFICANT CHANGE UP (ref 0–2)
BUN SERPL-MCNC: 8 MG/DL — SIGNIFICANT CHANGE UP (ref 7–23)
CALCIUM SERPL-MCNC: 8.9 MG/DL — SIGNIFICANT CHANGE UP (ref 8.4–10.5)
CHLORIDE SERPL-SCNC: 104 MMOL/L — SIGNIFICANT CHANGE UP (ref 96–108)
CO2 SERPL-SCNC: 24 MMOL/L — SIGNIFICANT CHANGE UP (ref 22–31)
CREAT SERPL-MCNC: 0.54 MG/DL — SIGNIFICANT CHANGE UP (ref 0.5–1.3)
EGFR: 121 ML/MIN/1.73M2 — SIGNIFICANT CHANGE UP
EOSINOPHIL # BLD AUTO: 0 K/UL — SIGNIFICANT CHANGE UP (ref 0–0.5)
EOSINOPHIL NFR BLD AUTO: 0 % — SIGNIFICANT CHANGE UP (ref 0–6)
GIANT PLATELETS BLD QL SMEAR: PRESENT — SIGNIFICANT CHANGE UP
GLUCOSE SERPL-MCNC: 136 MG/DL — HIGH (ref 70–99)
HCT VFR BLD CALC: 33.3 % — LOW (ref 34.5–45)
HGB BLD-MCNC: 9.6 G/DL — LOW (ref 11.5–15.5)
HYPOCHROMIA BLD QL: SLIGHT — SIGNIFICANT CHANGE UP
LYMPHOCYTES # BLD AUTO: 0.98 K/UL — LOW (ref 1–3.3)
LYMPHOCYTES # BLD AUTO: 7.8 % — LOW (ref 13–44)
MAGNESIUM SERPL-MCNC: 2 MG/DL — SIGNIFICANT CHANGE UP (ref 1.6–2.6)
MANUAL SMEAR VERIFICATION: SIGNIFICANT CHANGE UP
MCHC RBC-ENTMCNC: 21.1 PG — LOW (ref 27–34)
MCHC RBC-ENTMCNC: 28.8 GM/DL — LOW (ref 32–36)
MCV RBC AUTO: 73.2 FL — LOW (ref 80–100)
MICROCYTES BLD QL: SLIGHT — SIGNIFICANT CHANGE UP
MONOCYTES # BLD AUTO: 0.23 K/UL — SIGNIFICANT CHANGE UP (ref 0–0.9)
MONOCYTES NFR BLD AUTO: 1.8 % — LOW (ref 2–14)
NEUTROPHILS # BLD AUTO: 11.3 K/UL — HIGH (ref 1.8–7.4)
NEUTROPHILS NFR BLD AUTO: 90.4 % — HIGH (ref 43–77)
OVALOCYTES BLD QL SMEAR: SLIGHT — SIGNIFICANT CHANGE UP
PHOSPHATE SERPL-MCNC: 3.1 MG/DL — SIGNIFICANT CHANGE UP (ref 2.5–4.5)
PLAT MORPH BLD: NORMAL — SIGNIFICANT CHANGE UP
PLATELET # BLD AUTO: 224 K/UL — SIGNIFICANT CHANGE UP (ref 150–400)
POIKILOCYTOSIS BLD QL AUTO: SLIGHT — SIGNIFICANT CHANGE UP
POTASSIUM SERPL-MCNC: 4.8 MMOL/L — SIGNIFICANT CHANGE UP (ref 3.5–5.3)
POTASSIUM SERPL-SCNC: 4.8 MMOL/L — SIGNIFICANT CHANGE UP (ref 3.5–5.3)
RBC # BLD: 4.55 M/UL — SIGNIFICANT CHANGE UP (ref 3.8–5.2)
RBC # FLD: 20.5 % — HIGH (ref 10.3–14.5)
RBC BLD AUTO: ABNORMAL
SODIUM SERPL-SCNC: 138 MMOL/L — SIGNIFICANT CHANGE UP (ref 135–145)
WBC # BLD: 12.5 K/UL — HIGH (ref 3.8–10.5)
WBC # FLD AUTO: 12.5 K/UL — HIGH (ref 3.8–10.5)

## 2023-06-20 PROCEDURE — 86900 BLOOD TYPING SEROLOGIC ABO: CPT

## 2023-06-20 PROCEDURE — 86850 RBC ANTIBODY SCREEN: CPT

## 2023-06-20 PROCEDURE — 83735 ASSAY OF MAGNESIUM: CPT

## 2023-06-20 PROCEDURE — 85027 COMPLETE CBC AUTOMATED: CPT

## 2023-06-20 PROCEDURE — C1889: CPT

## 2023-06-20 PROCEDURE — 86901 BLOOD TYPING SEROLOGIC RH(D): CPT

## 2023-06-20 PROCEDURE — 80048 BASIC METABOLIC PNL TOTAL CA: CPT

## 2023-06-20 PROCEDURE — 84100 ASSAY OF PHOSPHORUS: CPT

## 2023-06-20 PROCEDURE — 36415 COLL VENOUS BLD VENIPUNCTURE: CPT

## 2023-06-20 PROCEDURE — 85025 COMPLETE CBC W/AUTO DIFF WBC: CPT

## 2023-06-20 RX ORDER — ACETAMINOPHEN 500 MG
20 TABLET ORAL
Qty: 320 | Refills: 0
Start: 2023-06-20 | End: 2023-06-23

## 2023-06-20 RX ORDER — ACETAMINOPHEN 500 MG
5 TABLET ORAL
Qty: 80 | Refills: 0
Start: 2023-06-20 | End: 2023-06-23

## 2023-06-20 RX ORDER — OMEPRAZOLE 10 MG/1
1 CAPSULE, DELAYED RELEASE ORAL
Qty: 0 | Refills: 0 | DISCHARGE

## 2023-06-20 RX ORDER — SODIUM CHLORIDE 9 MG/ML
1000 INJECTION, SOLUTION INTRAVENOUS
Refills: 0 | Status: DISCONTINUED | OUTPATIENT
Start: 2023-06-20 | End: 2023-06-20

## 2023-06-20 RX ORDER — OMEPRAZOLE 10 MG/1
1 CAPSULE, DELAYED RELEASE ORAL
Qty: 30 | Refills: 0
Start: 2023-06-20 | End: 2023-07-19

## 2023-06-20 RX ADMIN — Medication 650 MILLIGRAM(S): at 12:03

## 2023-06-20 RX ADMIN — Medication 650 MILLIGRAM(S): at 06:57

## 2023-06-20 RX ADMIN — Medication 650 MILLIGRAM(S): at 01:06

## 2023-06-20 RX ADMIN — SCOPALAMINE 1 PATCH: 1 PATCH, EXTENDED RELEASE TRANSDERMAL at 06:59

## 2023-06-20 NOTE — DISCHARGE NOTE PROVIDER - HOSPITAL COURSE
Patient is a 37 year old female with PMHX of HALINA, MO (s/p sleeve gastrectomy and HHR BMI 49.4) who presents to [facility] for conversion of sleeve to MDS and HHR. Patient underwent surgery on 6/19 and tolerated the procedure well. There were no perioperative complications. The rest of the patient's hospital course was unremarkable. Their pain was well controlled, they were able to tolerate a bariatric clear liquid diet of 4oz/hr for 4hrs without nausea or vomiting, and they were able to ambulate without assistance. At time of discharge, patient was afebrile, HDS, and deemed clinically fit for discharge home.    1) Please take Tylenol 650 mg every 4 to 6 hours by mouth for moderate pain control. Please do not exceed over 4,000 mg of Tylenol a day.  2) Please start taking Eliquis 2.5 mg by mouth twice a day starting 3 days after surgery .  3) Please take Omeprazole 40 mg once a day by mouth. Patient is a 37 year old female with PMHX of HALINA, MO (s/p sleeve gastrectomy and HHR BMI 49.4) who presents to [facility] for conversion of sleeve to MDS and HHR. Patient underwent surgery on 6/19 and tolerated the procedure well. There were no perioperative complications. The rest of the patient's hospital course was unremarkable. Their pain was well controlled, they were able to tolerate a bariatric clear liquid diet of 4oz/hr for 4hrs without nausea or vomiting, and they were able to ambulate without assistance. At time of discharge, patient was afebrile, HDS, and deemed clinically fit for discharge home.    1) Please take Tylenol 650 mg every 4 to 6 hours by mouth for moderate pain control. Please do not exceed over 4,000 mg of Tylenol a day.  2) Please take Omeprazole 40 mg once a day by mouth. Patient is a 37 year old female with PMHX of HALINA, MO (s/p sleeve gastrectomy and HHR BMI 49.4) who presents to [facility] for conversion of sleeve to MDS and HHR. Patient underwent surgery on 6/19 and tolerated the procedure well. There were no perioperative complications. The rest of the patient's hospital course was unremarkable. Their pain was well controlled, they were able to tolerate a bariatric clear liquid diet of 4oz/hr for 4hrs

## 2023-06-20 NOTE — DISCHARGE NOTE PROVIDER - NSDCCPCAREPLAN_GEN_ALL_CORE_FT
PRINCIPAL DISCHARGE DIAGNOSIS  Diagnosis: Elective surgery  Assessment and Plan of Treatment: Patient is a 37 year old female with PMHX of HALINA, MO (s/p sleeve gastrectomy and HHR BMI 49.4) who presents to [facility] for conversion of sleeve to MDS and HHR. Patient underwent surgery on 6/19 and tolerated the procedure well. There were no perioperative complications. The rest of the patient's hospital course was unremarkable. Their pain was well controlled, they were able to tolerate a bariatric clear liquid diet of 4oz/hr for 4hrs without nausea or vomiting, and they were able to ambulate without assistance. At time of discharge, patient was afebrile, HDS, and deemed clinically fit for discharge home.  1) Please take Tylenol 650 mg every 4 to 6 hours by mouth for moderate pain control. Please do not exceed over 4,000 mg of Tylenol a day.  2) Please start taking Eliquis 2.5 mg by mouth twice a day starting 3 days after surgery .  3) Please take Omeprazole 40 mg once a day by mouth.      SECONDARY DISCHARGE DIAGNOSES  Diagnosis: Iron deficiency anemia  Assessment and Plan of Treatment:     Diagnosis: Obesity, morbid, BMI 40.0-49.9  Assessment and Plan of Treatment:

## 2023-06-20 NOTE — DISCHARGE NOTE PROVIDER - CARE PROVIDER_API CALL
Adrian Cabrera Omari  Surgery  [address] [place] [address], [address] [place] [address]  Phone: [phone]  Fax: [phone]  Follow Up Time:

## 2023-06-20 NOTE — DIETITIAN INITIAL EVALUATION ADULT - PERTINENT LABORATORY DATA
06-20    138  |  104  |  8   ----------------------------<  136<H>  4.8   |  24  |  0.54    Ca    8.9      20 Jun 2023 06:48  Phos  3.1     06-20  Mg     2.0     06-20

## 2023-06-20 NOTE — DIETITIAN INITIAL EVALUATION ADULT - PERSON TAUGHT/METHOD
RD Discussed volumes of various cup sizes on tray table and encouraged aiming for 4 oz/hr as tolerated. Pt is prepared with protein shakes, with plan to get vitamins after discharge. RD provided indepth edu on diet advancement and specific nutrient needs s/p conversion of gastric sleeve to MDS. Pt appears receptive, verbalized understanding. Written nutrition education handouts provided./verbal instruction/written material/patient instructed

## 2023-06-20 NOTE — DISCHARGE NOTE PROVIDER - NSDCFUSCHEDAPPT_GEN_ALL_CORE_FT
[facility]  BARIATRIC CASTRO [address]  Scheduled Appointment: 07/06/2023

## 2023-06-20 NOTE — PROGRESS NOTE ADULT - ASSESSMENT
38 y/o female patient with iron deficiency anemia,  morbid obesity (s/p sleeve gastrectomy) and hiatal hernia;  s/p lap conversion of sleeve to MDS and HHR 06/19/23.    - BCLD/IVF  - Pain/nausea control  - IS/OOBA/ SCD's  - Routine labs  - Nutrition AM

## 2023-06-20 NOTE — DIETITIAN INITIAL EVALUATION ADULT - OTHER CALCULATIONS
Above energy needs calculated for wt maintenance (20-25kcal/kg IBW).  Weeks 1-2 estimated needs: 545-655kcal/day (10-12kcal/kg IBW), 82-115g pro/day (1.5-2.1g/kg IBW), >/=64oz clear fluids.

## 2023-06-20 NOTE — PROGRESS NOTE ADULT - REASON FOR ADMISSION
36 y/o female patient with morbid obesity (s/p sleeve gastrectomy) and hiatal hernia; comes in for conversion to modified duodenal switch and admission to [facility] for perioperative care.
36 y/o female patient with morbid obesity (s/p sleeve gastrectomy) and hiatal hernia; comes in for conversion to modified duodenal switch and admission to [facility] for perioperative care.

## 2023-06-20 NOTE — DIETITIAN INITIAL EVALUATION ADULT - COLLABORATION WITH OTHER PROVIDERS
Concerns:   Doing well.  No concerns today.  Discussed anenatal screening.  She accepts testing at this time.  Reportable signs and symptoms discussed.  Will schedule anatomy ultrasound.  RTC 4 weeks.      Vlad Tavarez MD     RD has collaborated with team in regards to diet/EN/PN recs, ONS/nourishment recs, pt's overall nutritional status.

## 2023-06-20 NOTE — DISCHARGE NOTE PROVIDER - DETAILS OF MALNUTRITION DIAGNOSIS/DIAGNOSES
This patient has been assessed with a concern for Malnutrition and was treated during this hospitalization for the following Nutrition diagnosis/diagnoses:     -  06/20/2023: Morbid obesity (BMI > 40)

## 2023-06-20 NOTE — DISCHARGE NOTE PROVIDER - NSDCMRMEDTOKEN_GEN_ALL_CORE_FT
Lexapro: 40 milligram(s) orally once a day  PriLOSEC 40 mg oral delayed release capsule: 1 cap(s) orally once a day   acetaminophen 160 mg/5 mL oral liquid: 5 milliliter(s) orally every 6 hours as needed for  moderate to severe pain MDD: 80 mL  Lexapro: 40 milligram(s) orally once a day  omeprazole 40 mg oral delayed release capsule: 1 cap(s) orally once a day MDD: 1 CAPSULE   acetaminophen 160 mg/5 mL oral liquid: 20 milliliter(s) orally every 6 hours as needed for  moderate to severe pain MDD: 80 mL  Lexapro: 40 milligram(s) orally once a day  omeprazole 40 mg oral delayed release capsule: 1 cap(s) orally once a day MDD: 1 CAPSULE

## 2023-06-20 NOTE — DIETITIAN INITIAL EVALUATION ADULT - OTHER INFO
36 y/o female patient with iron deficiency anemia,  morbid obesity (s/p sleeve gastrectomy) and hiatal hernia;  s/p lap conversion of sleeve to MDS and HHR 06/19/23.    Pt seen on 9UR at bedside. On assessment, pt resting in bed. Currently on BARICLLIQ diet, tolerating PO. Pt had sips of water out of the clear, 30cc cups. Pain and nausea well controlled. Discussed volumes of various cup sizes on tray table and encouraged aiming for 4 oz/hr as tolerated. Prepared with protein shakes, with plan to get vitamins after discharge. RD provided indepth edu on diet advancement and specific nutrient needs s/p conversion of gastric sleeve to MDS. NKFA. No dietary restrictions at home. Skin: surgical incisions. GI: WDL per flowsheet. Labs reviewed: elevated serum Glucose (136); will monitor trends. Pt's wt on admission was 288#, pt's IBW is 120#, pt is 240% of IBW; IBW to be utilized for nutrient calculations. RD observed pt with no overt signs of muscle or fat wasting. Based on ASPEN guidelines, pt does not meet criteria for malnutrition at this time. RD to follow up per protocol.

## 2023-06-20 NOTE — PROGRESS NOTE ADULT - SUBJECTIVE AND OBJECTIVE BOX
INTERVAL HPI/OVERNIGHT EVENTS: POC WNL, Passed TOV, post-op Hgb 10.2 (9.4)    STATUS POST: Conversion of sleeve to MDS and HHR    POST OPERATIVE DAY #: 1    SUBJECTIVE: Patient seen on AM rounds by chief resident and team 2 surgery. Patient feeling well pain is well controlled.           Vital Signs Last 24 Hrs  T(C): 36.8 (20 Jun 2023 05:23), Max: 36.8 (20 Jun 2023 05:23)  T(F): 98.2 (20 Jun 2023 05:23), Max: 98.2 (20 Jun 2023 05:23)  HR: 88 (20 Jun 2023 05:23) (66 - 112)  BP: 100/65 (20 Jun 2023 05:23) (100/65 - 164/89)  BP(mean): 101 (19 Jun 2023 20:30) (94 - 119)  RR: 18 (20 Jun 2023 05:23) (13 - 18)  SpO2: 93% (20 Jun 2023 05:23) (93% - 100%)    Parameters below as of 20 Jun 2023 05:23  Patient On (Oxygen Delivery Method): room air      I&O's Detail    19 Jun 2023 07:01  -  20 Jun 2023 06:33  --------------------------------------------------------  IN:    Lactated Ringers: 1980 mL  Total IN: 1980 mL    OUT:    Voided (mL): 1100 mL  Total OUT: 1100 mL    Total NET: 880 mL          General: NAD, resting comfortably in bed  C/V: NSR  Pulm: Nonlabored breathing, no respiratory distress  Abd: soft, appropriately TTP at incision sites/ND, incisions c/d/i, no rebound or guarding.  Extrem: WWP, no edema, SCDs in place        LABS:                        10.2   14.20 )-----------( 228      ( 19 Jun 2023 23:30 )             34.7                 RADIOLOGY & ADDITIONAL STUDIES:   INTERVAL HPI/OVERNIGHT EVENTS: POC WNL, Passed TOV, post-op Hgb 10.2 (9.4)    STATUS POST: Conversion of sleeve to MDS and HHR    POST OPERATIVE DAY #: 1    SUBJECTIVE: Patient seen on AM rounds by chief resident and team 2 surgery. Patient feeling well pain is well controlled. Tolerating BCLD without nausea or vomiting. No acute complaints.          Vital Signs Last 24 Hrs  T(C): 36.8 (20 Jun 2023 05:23), Max: 36.8 (20 Jun 2023 05:23)  T(F): 98.2 (20 Jun 2023 05:23), Max: 98.2 (20 Jun 2023 05:23)  HR: 88 (20 Jun 2023 05:23) (66 - 112)  BP: 100/65 (20 Jun 2023 05:23) (100/65 - 164/89)  BP(mean): 101 (19 Jun 2023 20:30) (94 - 119)  RR: 18 (20 Jun 2023 05:23) (13 - 18)  SpO2: 93% (20 Jun 2023 05:23) (93% - 100%)    Parameters below as of 20 Jun 2023 05:23  Patient On (Oxygen Delivery Method): room air      I&O's Detail    19 Jun 2023 07:01  -  20 Jun 2023 06:33  --------------------------------------------------------  IN:    Lactated Ringers: 1980 mL  Total IN: 1980 mL    OUT:    Voided (mL): 1100 mL  Total OUT: 1100 mL    Total NET: 880 mL          General: NAD, resting comfortably in bed  C/V: NSR  Pulm: Nonlabored breathing, no respiratory distress  Abd: soft, appropriately TTP at incision sites/ND, incisions c/d/i, no rebound or guarding.  Extrem: WWP, no edema, SCDs in place        LABS:                        10.2   14.20 )-----------( 228      ( 19 Jun 2023 23:30 )             34.7                 RADIOLOGY & ADDITIONAL STUDIES:

## 2023-06-20 NOTE — DISCHARGE NOTE PROVIDER - NSDCFUADDINST_GEN_ALL_CORE_FT
Follow up with Dr. Cabrera in 1 week. Call the office at  to schedule your appointment. You may shower; soap and water over incision sites. Do not scrub. Pat dry when done. No tub bathing or swimming until cleared. Keep incision sites out of the sun as scars will darken. No heavy lifting (>10lbs) or strenuous exercise. Diet: Bariatric Full Fluids. 60 grams protein daily.  64 fluid ounces water daily. Drink small sips throughout the day. Continue diet as outlined by paperwork received as a pre-operative patient. You should be urinating at least 3-4x per day. Call the office if you experience increasing abdominal pain, nausea, vomiting, or temperature >100.4F.  NO ASPIRIN OR NSAIDs until approved by Dr. Cabrera. Avoid alcoholic beverages until cleared by Dr. Cabrera.    1) Please take Tylenol 650 mg every 4 to 6 hours by mouth for moderate pain control. Please do not exceed over 4,000 mg of Tylenol a day.  2) Please start taking Eliquis 2.5 mg by mouth twice a day starting 3 days after surgery .  3) Please take Omeprazole 40 mg once a day by mouth.

## 2023-06-20 NOTE — DIETITIAN INITIAL EVALUATION ADULT - REASON INDICATOR FOR ASSESSMENT
consult r/t education s/p conversion of gastric sleeve to MDS  consult r/t education s/p conversion of gastric sleeve to MDS

## 2023-06-20 NOTE — DIETITIAN INITIAL EVALUATION ADULT - PERTINENT MEDS FT
MEDICATIONS  (STANDING):  acetaminophen   Oral Liquid .. 650 milliGRAM(s) Oral every 6 hours  dextrose 5% + sodium chloride 0.45%. 1000 milliLiter(s) (80 mL/Hr) IV Continuous <Continuous>  escitalopram Solution 40 milliGRAM(s) Oral daily  pantoprazole  Injectable 40 milliGRAM(s) IV Push daily  thiamine IVPB 500 milliGRAM(s) IV Intermittent daily    MEDICATIONS  (PRN):  HYDROmorphone  Injectable 0.5 milliGRAM(s) IV Push every 15 minutes PRN Moderate Pain (4 - 6)  ondansetron Injectable 4 milliGRAM(s) IV Push every 8 hours PRN Nausea and/or Vomiting

## 2023-06-23 ENCOUNTER — NON-APPOINTMENT (OUTPATIENT)
Age: 38
End: 2023-06-23

## 2023-06-23 DIAGNOSIS — K76.0 FATTY (CHANGE OF) LIVER, NOT ELSEWHERE CLASSIFIED: ICD-10-CM

## 2023-06-23 DIAGNOSIS — K44.9 DIAPHRAGMATIC HERNIA WITHOUT OBSTRUCTION OR GANGRENE: ICD-10-CM

## 2023-06-23 DIAGNOSIS — D50.9 IRON DEFICIENCY ANEMIA, UNSPECIFIED: ICD-10-CM

## 2023-06-23 DIAGNOSIS — G47.33 OBSTRUCTIVE SLEEP APNEA (ADULT) (PEDIATRIC): ICD-10-CM

## 2023-06-23 DIAGNOSIS — E66.01 MORBID (SEVERE) OBESITY DUE TO EXCESS CALORIES: ICD-10-CM

## 2023-06-23 DIAGNOSIS — K21.9 GASTRO-ESOPHAGEAL REFLUX DISEASE WITHOUT ESOPHAGITIS: ICD-10-CM

## 2023-07-06 ENCOUNTER — APPOINTMENT (OUTPATIENT)
Dept: BARIATRICS | Facility: CLINIC | Age: 38
End: 2023-07-06
Payer: MEDICAID

## 2023-07-06 VITALS
BODY MASS INDEX: 35.17 KG/M2 | WEIGHT: 206 LBS | DIASTOLIC BLOOD PRESSURE: 71 MMHG | HEIGHT: 64 IN | OXYGEN SATURATION: 98 % | SYSTOLIC BLOOD PRESSURE: 102 MMHG | TEMPERATURE: 98 F | HEART RATE: 71 BPM

## 2023-07-06 PROBLEM — F41.9 ANXIETY DISORDER, UNSPECIFIED: Chronic | Status: ACTIVE | Noted: 2023-06-16

## 2023-07-06 PROCEDURE — 99024 POSTOP FOLLOW-UP VISIT: CPT

## 2023-09-06 ENCOUNTER — APPOINTMENT (OUTPATIENT)
Dept: BARIATRICS | Facility: CLINIC | Age: 38
End: 2023-09-06
Payer: MEDICAID

## 2023-09-06 VITALS
BODY MASS INDEX: 32.16 KG/M2 | HEIGHT: 64 IN | SYSTOLIC BLOOD PRESSURE: 115 MMHG | WEIGHT: 188.38 LBS | OXYGEN SATURATION: 100 % | HEART RATE: 62 BPM | TEMPERATURE: 97.6 F | DIASTOLIC BLOOD PRESSURE: 76 MMHG

## 2023-09-06 PROCEDURE — 99024 POSTOP FOLLOW-UP VISIT: CPT

## 2023-09-06 NOTE — HISTORY OF PRESENT ILLNESS
[de-identified] : Ms. Eden returns today for follow up accompanied by her . She is a 38-year-old F s/p VSG conversion to MDS and hiatal hernia repair on 6/19/23. States she has been doing generally well. Has lost 35 lbs. since the surgery. Maintaining a healthy diet and compliant of bariatric recommendations. Diet reviewed and educated on modifications. On iron infusions. No concerns at this time. She is doing generally well from a surgical standpoint. Patient to start resistance training at least 2-3 times per week and cardiovascular exercises. Have ordered blood work. Compliance to healthy diet and vitamins and supplements emphasized. Follow up in 6 months.

## 2023-09-06 NOTE — END OF VISIT
[FreeTextEntry3] : All medical record entries made by the Scribe were at my, WU Amaro , direction and personally dictated by me on 09/06/2023 . I have reviewed the chart and agree that the record accurately reflects my personal performance of the history, physical exam, assessment and plan. I have also personally directed, reviewed, and agreed with the chart.

## 2023-09-06 NOTE — ASSESSMENT
[FreeTextEntry1] : Ms. Eden returns today for follow up accompanied by her . She is a 38-year-old F s/p VSG conversion to MDS and hiatal hernia repair on 6/19/23. States she has been doing generally well. Maintaining a healthy diet and compliant of bariatric recommendations. Diet reviewed and educated on modifications. On iron infusions. No concerns at this time. She is doing generally well from a surgical standpoint. Patient to start resistance training at least 2-3 times per week and cardiovascular exercises. Have ordered blood work. Compliance to healthy diet and vitamins and supplements emphasized. Follow up in 6 months.

## 2023-09-06 NOTE — PLAN
[FreeTextEntry1] : Patient to start resistance training at least 2-3 times per week and cardiovascular exercises. Have ordered blood work. Compliance to healthy diet and vitamins and supplements emphasized. Follow up in 6 months.

## 2023-10-18 ENCOUNTER — APPOINTMENT (OUTPATIENT)
Dept: BARIATRICS | Facility: CLINIC | Age: 38
End: 2023-10-18
Payer: MEDICAID

## 2023-10-18 VITALS — WEIGHT: 182 LBS | BODY MASS INDEX: 31.07 KG/M2 | HEIGHT: 64 IN

## 2023-10-18 DIAGNOSIS — E66.01 MORBID (SEVERE) OBESITY DUE TO EXCESS CALORIES: ICD-10-CM

## 2023-10-18 PROCEDURE — 99213 OFFICE O/P EST LOW 20 MIN: CPT | Mod: 95

## 2023-12-18 ENCOUNTER — RX RENEWAL (OUTPATIENT)
Age: 38
End: 2023-12-18

## 2023-12-18 RX ORDER — ERGOCALCIFEROL 1.25 MG/1
1.25 MG CAPSULE, LIQUID FILLED ORAL
Qty: 4 | Refills: 3 | Status: ACTIVE | COMMUNITY
Start: 2023-10-24 | End: 1900-01-01

## 2024-02-06 NOTE — H&P ADULT - ASSESSMENT
Paperwork signed and faxed.     38 y/o female patient with iron deficiency anemia,  morbid obesity (s/p sleeve gastrectomy) and hiatal hernia; comes in for conversion to modified duodenal switch and admission to [facility] for perioperative care. Mild OLIVER that does not require CPAP, denies persistent reflux and any other GI complains.     Plan: To OR for lap conversion of sleeve gastrectomy to modified duodenal switch and hiatal hernia repair. Admit to [facility] for perioperative care.

## 2024-03-11 DIAGNOSIS — Z00.00 ENCOUNTER FOR GENERAL ADULT MEDICAL EXAMINATION W/OUT ABNORMAL FINDINGS: ICD-10-CM

## 2024-03-12 ENCOUNTER — NON-APPOINTMENT (OUTPATIENT)
Age: 39
End: 2024-03-12

## 2024-03-21 ENCOUNTER — APPOINTMENT (OUTPATIENT)
Dept: BARIATRICS | Facility: CLINIC | Age: 39
End: 2024-03-21
Payer: MEDICAID

## 2024-03-21 VITALS — BODY MASS INDEX: 29.19 KG/M2 | WEIGHT: 171 LBS | HEIGHT: 64 IN

## 2024-03-21 DIAGNOSIS — Z98.84 BARIATRIC SURGERY STATUS: ICD-10-CM

## 2024-03-21 PROCEDURE — 99213 OFFICE O/P EST LOW 20 MIN: CPT

## 2024-05-21 ENCOUNTER — RX RENEWAL (OUTPATIENT)
Age: 39
End: 2024-05-21

## 2024-05-21 RX ORDER — ERGOCALCIFEROL 1.25 MG/1
1.25 MG CAPSULE, LIQUID FILLED ORAL
Qty: 8 | Refills: 2 | Status: ACTIVE | COMMUNITY
Start: 2024-03-21 | End: 1900-01-01

## 2024-12-10 DIAGNOSIS — Z98.84 BARIATRIC SURGERY STATUS: ICD-10-CM

## 2025-04-16 ENCOUNTER — APPOINTMENT (OUTPATIENT)
Dept: BARIATRICS | Facility: CLINIC | Age: 40
End: 2025-04-16
Payer: MEDICAID

## 2025-04-16 VITALS — BODY MASS INDEX: 29.88 KG/M2 | WEIGHT: 175 LBS | HEIGHT: 64 IN

## 2025-04-16 DIAGNOSIS — E66.01 MORBID (SEVERE) OBESITY DUE TO EXCESS CALORIES: ICD-10-CM

## 2025-04-16 PROCEDURE — 99214 OFFICE O/P EST MOD 30 MIN: CPT | Mod: 93

## 2025-05-15 DIAGNOSIS — Z98.84 BARIATRIC SURGERY STATUS: ICD-10-CM

## 2025-07-11 ENCOUNTER — RX RENEWAL (OUTPATIENT)
Age: 40
End: 2025-07-11

## (undated) DEVICE — STAPLER COVIDIEN ENDO GIA XL HANDLE

## (undated) DEVICE — SUT PDO 2-0 1/2 CIRCLE 26MM NDL 15CM

## (undated) DEVICE — TROCAR ETHICON ENDOPATH XCEL UNIVERSAL SLEEVE 12MM X 100M STABILITY

## (undated) DEVICE — PACK GENERAL LAPAROSCOPY

## (undated) DEVICE — TROCAR ETHICON ENDOPATH XCEL BLADELESS 15MM X 100MM STABILITY

## (undated) DEVICE — DRAPE 1/2 SHEET 40X57"

## (undated) DEVICE — D HELP - CLEARVIEW CLEARIFY SYSTEM

## (undated) DEVICE — Device

## (undated) DEVICE — TIP METZENBAUM SCISSOR MONOPOLAR ENDOCUT (ORANGE)

## (undated) DEVICE — SUT VICRYL 0 54" TIES

## (undated) DEVICE — DRAPE LEGGINGS XL

## (undated) DEVICE — VENODYNE/SCD SLEEVE CALF BARIATRIC

## (undated) DEVICE — MARKING PEN W RULER

## (undated) DEVICE — FORCEP RADIAL JAW 4 W NDL 2.2MM 2.8MM 240CM ORANGE DISP

## (undated) DEVICE — SUT PDS II 2-0 27" SH

## (undated) DEVICE — ENDOCATCH II 15MM

## (undated) DEVICE — CATH NG SALEM SUMP 16FR

## (undated) DEVICE — POSITIONER SAGE MOBILITY TRANSFER PAD

## (undated) DEVICE — GLV 6.5 PROTEXIS (WHITE)

## (undated) DEVICE — TROCAR ETHICON ENDOPATH XCEL BLADELESS 5MM X 100MM STABILITY

## (undated) DEVICE — TROCAR ETHICON ENDOPATH XCEL UNIVERSAL SLEEVE WITH OPTIVIEW 5MM X 100MM

## (undated) DEVICE — TROCAR ETHICON ENDOPATH XCEL BLADELESS 12MM X 100MM STABILITY

## (undated) DEVICE — POSITIONER FOAM EGG CRATE ULNAR 2PCS (PINK)

## (undated) DEVICE — SUT MONOCRYL 4-0 27" PS-2 UNDYED

## (undated) DEVICE — LIGASURE MARYLAND 37CM

## (undated) DEVICE — DRSG DERMABOND 0.7ML

## (undated) DEVICE — SYR LUER LOK 30CC

## (undated) DEVICE — TUBING STRYKER PNEUMOCLEAR HIGH FLOW

## (undated) DEVICE — DRAPE 3/4 SHEET 52X76"

## (undated) DEVICE — SUT QUILL POLYPROPYLENE 1 15CM 22MM CLEAR

## (undated) DEVICE — STAPLER ECHELON FLEX POWERED PLUS 440MM